# Patient Record
Sex: FEMALE | Race: WHITE | Employment: OTHER | URBAN - METROPOLITAN AREA
[De-identification: names, ages, dates, MRNs, and addresses within clinical notes are randomized per-mention and may not be internally consistent; named-entity substitution may affect disease eponyms.]

---

## 2017-01-12 ENCOUNTER — DOCUMENTATION ONLY (OUTPATIENT)
Dept: OTHER | Facility: CLINIC | Age: 82
End: 2017-01-12

## 2017-01-12 DIAGNOSIS — Z71.89 ACP (ADVANCE CARE PLANNING): Primary | Chronic | ICD-10-CM

## 2017-05-02 ENCOUNTER — APPOINTMENT (OUTPATIENT)
Age: 82
Setting detail: DERMATOLOGY
End: 2017-05-14

## 2017-05-02 PROBLEM — C44.319 BASAL CELL CARCINOMA OF SKIN OF OTHER PARTS OF FACE: Status: ACTIVE | Noted: 2017-05-02

## 2017-05-02 PROCEDURE — OTHER MIPS QUALITY: OTHER

## 2017-05-02 PROCEDURE — OTHER DEFER: OTHER

## 2017-05-02 PROCEDURE — OTHER COUNSELING: OTHER

## 2017-05-02 PROCEDURE — 99213 OFFICE O/P EST LOW 20 MIN: CPT

## 2017-05-02 NOTE — PROCEDURE: MIPS QUALITY
Quality 431: Preventive Care And Screening: Unhealthy Alcohol Use - Screening: Patient screened for unhealthy alcohol use using a single question and scores less than 2 times per year
Detail Level: Detailed
Quality 110: Preventive Care And Screening: Influenza Immunization: Influenza Immunization previously received during influenza season
Quality 130: Documentation Of Current Medications In The Medical Record: Current Medications Documented
Quality 226: Preventive Care And Screening: Tobacco Use: Screening And Cessation Intervention: Patient screened for tobacco and never smoked

## 2017-05-02 NOTE — PROCEDURE: DEFER
Procedure To Be Performed At Next Visit: Mohs surgery
Detail Level: Simple
Instructions (Optional): Due to patients age and the fact that this site is not symptomatic at this time, Macey, and her children (Amanda, Jez and Ayden) have elected to observe site for the time being. Will plan to recheck site in 6 months and plan for MOHs if/when the site recurs or starts causing Macey issues.

## 2018-09-19 ENCOUNTER — APPOINTMENT (OUTPATIENT)
Age: 83
Setting detail: DERMATOLOGY
End: 2018-09-28

## 2018-09-19 DIAGNOSIS — L85.8 OTHER SPECIFIED EPIDERMAL THICKENING: ICD-10-CM

## 2018-09-19 DIAGNOSIS — L21.8 OTHER SEBORRHEIC DERMATITIS: ICD-10-CM

## 2018-09-19 DIAGNOSIS — L57.0 ACTINIC KERATOSIS: ICD-10-CM

## 2018-09-19 DIAGNOSIS — L01.01 NON-BULLOUS IMPETIGO: ICD-10-CM

## 2018-09-19 PROBLEM — D48.5 NEOPLASM OF UNCERTAIN BEHAVIOR OF SKIN: Status: ACTIVE | Noted: 2018-09-19

## 2018-09-19 PROCEDURE — OTHER COUNSELING: OTHER

## 2018-09-19 PROCEDURE — 99213 OFFICE O/P EST LOW 20 MIN: CPT | Mod: 25

## 2018-09-19 PROCEDURE — OTHER PRESCRIPTION: OTHER

## 2018-09-19 PROCEDURE — 11101: CPT

## 2018-09-19 PROCEDURE — OTHER BIOPSY BY SHAVE METHOD: OTHER

## 2018-09-19 PROCEDURE — OTHER MIPS QUALITY: OTHER

## 2018-09-19 PROCEDURE — 11100: CPT

## 2018-09-19 RX ORDER — TRIAMCINOLONE ACETONIDE 1 MG/G
CREAM TOPICAL BID
Qty: 45 | Refills: 1 | Status: ERX | COMMUNITY
Start: 2018-09-19

## 2018-09-19 RX ORDER — CEPHALEXIN 250 MG/1
250 CAPSULE ORAL BID
Qty: 10 | Refills: 0 | Status: ERX | COMMUNITY
Start: 2018-09-19

## 2018-09-19 ASSESSMENT — LOCATION ZONE DERM
LOCATION ZONE: HAND
LOCATION ZONE: ARM

## 2018-09-19 ASSESSMENT — LOCATION SIMPLE DESCRIPTION DERM
LOCATION SIMPLE: LEFT HAND
LOCATION SIMPLE: LEFT FOREARM

## 2018-09-19 ASSESSMENT — LOCATION DETAILED DESCRIPTION DERM
LOCATION DETAILED: LEFT DISTAL RADIAL DORSAL FOREARM
LOCATION DETAILED: LEFT RADIAL DORSAL HAND

## 2018-09-19 NOTE — PROCEDURE: BIOPSY BY SHAVE METHOD
Billing Type: Third-Party Bill
Hemostasis: Aluminum Chloride and Electrocautery
Destruction After The Procedure: Yes
Cryotherapy Text: The wound bed was treated with cryotherapy after the biopsy was performed.
Wound Care: Vaseline
Size Of Lesion In Cm: 2.3
Body Location Override (Optional - Billing Will Still Be Based On Selected Body Map Location If Applicable): L radial wrist
Type Of Destruction Used: Electrodesiccation
Silver Nitrate Text: The wound bed was treated with silver nitrate after the biopsy was performed.
X Size Of Lesion In Cm: 1.3
Biopsy Method: double edge Personna blade
Consent: Written consent was obtained and risks were reviewed including but not limited to scarring, infection, bleeding, scabbing, incomplete removal, nerve damage and allergy to anesthesia.
Curettage Text: The wound bed was treated with curettage after the biopsy was performed.
Additional Anesthesia Volume In Cc (Will Not Render If 0): 0
Detail Level: Detailed
Body Location Override (Optional - Billing Will Still Be Based On Selected Body Map Location If Applicable): L dorsal hand 3rd ray
Notification Instructions: Patient will be notified of biopsy results. However, patient instructed to call the office if not contacted within 2 weeks.
X Size Of Lesion In Cm: 1
Anesthesia Type: 1% lidocaine with epinephrine and a 1:10 solution of 8.4% sodium bicarbonate
Post-Care Instructions: I reviewed with the patient in detail post-care instructions. Patient is to keep the biopsy site cover and dry overnight, and then apply H2O2,  vaseline  and a bandage daily until healed.
Anesthesia Volume In Cc (Will Not Render If 0): 1.5
Dressing: Band-Aid
Size Of Lesion In Cm: 1.6
Biopsy Type: H and E
Electrodesiccation Text: The wound bed was treated with electrodesiccation after the biopsy was performed.
Bill For Surgical Tray: no
Electrodesiccation And Curettage Text: The wound bed was treated with electrodesiccation and curettage after the biopsy was performed.
Depth Of Biopsy: dermis
Dressing: pressure dressing with telfa

## 2018-09-19 NOTE — PROCEDURE: MIPS QUALITY
Detail Level: Detailed
Quality 110: Preventive Care And Screening: Influenza Immunization: Influenza Immunization previously received during influenza season
Quality 131: Pain Assessment And Follow-Up: Pain assessment using a standardized tool is documented as negative, no follow-up plan required
Quality 431: Preventive Care And Screening: Unhealthy Alcohol Use - Screening: Patient screened for unhealthy alcohol use using a single question and scores less than 2 times per year
Quality 130: Documentation Of Current Medications In The Medical Record: Current Medications Documented
Quality 265: Biopsy Follow-Up: Biopsy results reviewed, communicated, tracked, and documented
Quality 226: Preventive Care And Screening: Tobacco Use: Screening And Cessation Intervention: Patient screened for tobacco and never smoked

## 2020-07-26 ENCOUNTER — NURSING HOME VISIT (OUTPATIENT)
Dept: GERIATRICS | Facility: CLINIC | Age: 85
End: 2020-07-26
Payer: MEDICARE

## 2020-07-26 DIAGNOSIS — Z53.9 ERRONEOUS ENCOUNTER--DISREGARD: Primary | ICD-10-CM

## 2020-07-27 ENCOUNTER — NURSING HOME VISIT (OUTPATIENT)
Dept: GERIATRICS | Facility: CLINIC | Age: 85
End: 2020-07-27
Payer: MEDICARE

## 2020-07-27 VITALS
DIASTOLIC BLOOD PRESSURE: 64 MMHG | RESPIRATION RATE: 18 BRPM | BODY MASS INDEX: 26.44 KG/M2 | HEART RATE: 60 BPM | TEMPERATURE: 97.1 F | SYSTOLIC BLOOD PRESSURE: 116 MMHG | WEIGHT: 143.7 LBS | HEIGHT: 62 IN | OXYGEN SATURATION: 96 %

## 2020-07-27 DIAGNOSIS — A09 DIARRHEA OF INFECTIOUS ORIGIN: ICD-10-CM

## 2020-07-27 DIAGNOSIS — A02.0 SALMONELLA ENTERITIS: Primary | ICD-10-CM

## 2020-07-27 DIAGNOSIS — N18.4 CHRONIC KIDNEY DISEASE, STAGE 4 (SEVERE) (H): ICD-10-CM

## 2020-07-27 DIAGNOSIS — I10 ESSENTIAL HYPERTENSION: ICD-10-CM

## 2020-07-27 PROCEDURE — 99305 1ST NF CARE MODERATE MDM 35: CPT | Performed by: NURSE PRACTITIONER

## 2020-07-27 RX ORDER — GINSENG 100 MG
CAPSULE ORAL 2 TIMES DAILY PRN
COMMUNITY
End: 2020-12-28

## 2020-07-27 RX ORDER — CIPROFLOXACIN 500 MG/1
500 TABLET, FILM COATED ORAL EVERY 24 HOURS
COMMUNITY
Start: 2020-07-26 | End: 2020-07-31

## 2020-07-27 RX ORDER — LACTOBACILLUS RHAMNOSUS GG 10B CELL
1 CAPSULE ORAL 2 TIMES DAILY
COMMUNITY

## 2020-07-27 RX ORDER — CLONIDINE HYDROCHLORIDE 0.1 MG/1
0.1 TABLET ORAL 2 TIMES DAILY
COMMUNITY
End: 2020-08-06 | Stop reason: DRUGHIGH

## 2020-07-27 RX ORDER — LOPERAMIDE HCL 2 MG
2 CAPSULE ORAL EVERY 4 HOURS PRN
COMMUNITY

## 2020-07-27 RX ORDER — LEVOTHYROXINE SODIUM 88 UG/1
88 TABLET ORAL DAILY
COMMUNITY

## 2020-07-27 RX ORDER — VIT A/VIT C/VIT E/ZINC/COPPER 4296-226
1 CAPSULE ORAL 2 TIMES DAILY
COMMUNITY

## 2020-07-27 RX ORDER — GABAPENTIN 300 MG/1
300 CAPSULE ORAL AT BEDTIME
COMMUNITY

## 2020-07-27 RX ORDER — TRIAMCINOLONE ACETONIDE 1 MG/G
CREAM TOPICAL 2 TIMES DAILY PRN
COMMUNITY

## 2020-07-27 RX ORDER — ACETAMINOPHEN 500 MG
1000 TABLET ORAL 2 TIMES DAILY
COMMUNITY

## 2020-07-27 RX ORDER — DORZOLAMIDE HCL 20 MG/ML
1 SOLUTION/ DROPS OPHTHALMIC 2 TIMES DAILY
COMMUNITY

## 2020-07-27 RX ORDER — CALCIUM CARBONATE 500 MG/1
1 TABLET, CHEWABLE ORAL PRN
COMMUNITY

## 2020-07-27 RX ORDER — DESONIDE 0.5 MG/G
OINTMENT TOPICAL 2 TIMES DAILY
COMMUNITY
End: 2020-12-28

## 2020-07-27 RX ORDER — SIMVASTATIN 20 MG
20 TABLET ORAL AT BEDTIME
COMMUNITY

## 2020-07-27 RX ORDER — CARBOXYMETHYLCELLULOSE SODIUM 10 MG/ML
1 GEL OPHTHALMIC 2 TIMES DAILY
COMMUNITY

## 2020-07-27 RX ORDER — TIMOLOL MALEATE 5 MG/ML
1 SOLUTION/ DROPS OPHTHALMIC 2 TIMES DAILY
COMMUNITY

## 2020-07-27 RX ORDER — CYANOCOBALAMIN
1000 KIT
COMMUNITY

## 2020-07-27 RX ORDER — ATENOLOL 50 MG/1
50 TABLET ORAL DAILY
COMMUNITY
End: 2020-12-28

## 2020-07-27 ASSESSMENT — MIFFLIN-ST. JEOR: SCORE: 995.07

## 2020-07-27 NOTE — PROGRESS NOTES
[place] [facility]  PRIMARY CARE PROVIDER AND CLINIC:  No primary care provider on file., No primary physician on file.  Chief Complaint   Patient presents with     Hospital F/U     [place] Medical Record Number:  [id number]  Place of Service where encounter took place:  [facility] ([facility]) [[id number]]    Emily Lockett  is a 89 year old  (12/7/1923), admitted to the above facility from  [facility] . Hospital stay 7/22/20 through 7/26/20..  Admitted to this facility for  rehab, medical management and nursing care.    HPI:    HPI information obtained from: facility chart records, facility staff and patient report.   Brief Summary of Hospital Course:   Updates on Status Since Skilled nursing Admission:     Patient Emily Lockett is a 89 yr old female admitted to [facility] for rehabilitation s/p hospitalization [facility] 7/22-7/26/20 for diarrhea with Salmonella enteritis, dehydration, hypokalemia due to excessive gastrointestinal loss of potassium and weakness. Patient hydrated and electrolytes replaced and started on as needed imodium. History of constipation and laxative on hold  Did develop wheezing 7/24/20 and required oxygen and weaned off   Patient started on Cipro for Salmonella    PMHx hypertension, hyperlipidemia, CKD stage IV (baseline Cr ~1.7), anemia (hgb ~10), recurrent basal cell carcinoma, mild TBI, osteoporosis, chronic pain, neuropathy, hypothyroidism, and Vitamin B12     TODAY  Patient states diarrhea resolved and appetite improving  Participating in therapies and goals is to return to assistive living          Received verbal consent to use Care Everywhere in order to access labs, documents, histories, and all other needed information to provide care at current facility.    CODE STATUS/ADVANCE DIRECTIVES DISCUSSION:   DNR / DNI  Patient's living condition: lives in an assisted living facility  ALLERGIES: Codeine; Nsaids; and Penicillins  PAST  MEDICAL HISTORY:  has a past medical history of Acute pancreatitis, Backache, Embolism and thrombosis (H), Essential hypertension, Fibrocystic breast, Hyperlipidemia, Osteoporosis, Rectocele, Renal insufficiency, and Vitamin B12 deficiency.  PAST SURGICAL HISTORY:   has a past surgical history that includes REMOVAL GALLBLADDER; tonsillectomy; adenoidectomy; and CATARACT REMOVAL.  FAMILY HISTORY: family history includes Arthritis in her mother; Breast Cancer in an other family member.  SOCIAL HISTORY:   reports that she has never smoked. She has never used smokeless tobacco.    Post Discharge Medication Reconciliation Status: discharge medications reconciled and changed, per note/orders      Current Outpatient Medications   Medication Sig Dispense Refill     acetaminophen (TYLENOL) 500 MG tablet Take 1,000 mg by mouth 2 times daily  MG every day PRN       atenolol (TENORMIN) 50 MG tablet Take 50 mg by mouth 2 times daily       bacitracin 500 UNIT/GM OINT Apply topically 2 times daily as needed for wound care For rash behind ear       bimatoprost (LUMIGAN) 0.01 % SOLN Place 1 drop into both eyes At Bedtime       calcium carbonate (TUMS) 500 MG chewable tablet Take 1 chew tab by mouth as needed for heartburn 2-3 TIMES every day PRN       carboxymethylcellulose PF (REFRESH LIQUIGEL) 1 % ophthalmic gel Place 1 drop into both eyes 3 times daily       Cholecalciferol (VITAMIN D3 PO) Take 1 capsule by mouth daily       ciprofloxacin (CIPRO) 500 MG tablet Take 500 mg by mouth every 24 hours       cloNIDine (CATAPRES) 0.1 MG tablet Take 0.1 mg by mouth 2 times daily       Cyanocobalamin (VITAMIN DEFICIENCY SYSTEM-B12) 1000 MCG/ML KIT Inject 1,000 mcg as directed every 28 days       desonide (DESOWEN) 0.05 % external ointment Apply topically 2 times daily For rash       dorzolamide (TRUSOPT) 2 % ophthalmic solution Place 1 drop into both eyes 2 times daily       FUROSEMIDE PO Take 10 mg by mouth daily       gabapentin  "(NEURONTIN) 300 MG capsule Take 300 mg by mouth At Bedtime       lactobacillus rhamnosus, GG, (CULTURELL) capsule Take 1 capsule by mouth 2 times daily       levothyroxine (SYNTHROID/LEVOTHROID) 100 MCG tablet Take 100 mcg by mouth daily       miconazole (MICATIN) 2 % AERP powder Apply topically 2 times daily as needed Apply to under breasts topically as needed for redness       Multiple Vitamins-Minerals (PRESERVISION AREDS) CAPS Take 1 capsule by mouth 2 times daily       omeprazole (PRILOSEC) 20 MG DR capsule Take 20 mg by mouth daily       simvastatin (ZOCOR) 20 MG tablet Take 20 mg by mouth At Bedtime       timolol maleate (TIMOPTIC) 0.5 % ophthalmic solution Place 1 drop into both eyes 2 times daily       triamcinolone (KENALOG) 0.1 % external cream Apply topically 2 times daily as needed for irritation         ROS:  10 point ROS of systems including Constitutional, Eyes, Respiratory, Cardiovascular, Gastroenterology, Genitourinary, Integumentary, Musculoskeletal, Psychiatric were all negative except for pertinent positives noted in my HPI.    Vitals:  /64   Pulse 60   Temp 97.1  F (36.2  C)   Resp 18   Ht 1.575 m (5' 2\")   Wt 65.2 kg (143 lb 11.2 oz)   SpO2 96%   BMI 26.28 kg/m    Exam:  GENERAL APPEARANCE:  Alert, in no distress  ENT:  Mouth and posterior oropharynx normal, moist mucous membranes  EYES:  EOM, conjunctivae, lids, pupils and irises normal  NECK:  No adenopathy,masses or thyromegaly  RESP:  no respiratory distress  M/S:   sitting in bed  SKIN:  Inspection of skin and subcutaneous tissue baseline  NEURO:   Cranial nerves 2-12 are normal tested and grossly at patient's baseline  PSYCH:  oriented X 3    Lab/Diagnostic data:  Labs done in SNF are in [place] EPIC. Please refer to them using [employer]/Care Everywhere.    ASSESSMENT/PLAN:  Salmonella enteritis  Diarrhea of infectious origin  Diarrhea resolve per patient   Continue Cipro course, monitor   Appetite improve, monitor   Labs " stable, monitor     Essential hypertension  blood pressure managed  Continue Clonidine & Atenolol, monitor   Presumed CHF  Continue lasix, monitor     Chronic kidney disease, stage 4 (severe) (H)  Stable, monitor     Chronic pain management  neuropathy  Denies pain   Continue Tylenol and as needed Tylenol & Neurontin, monitor     Hypothyroidism  Continue Levothyroxine  Monitor outpatient labs    Advanced care planning  Patient wishes to be DNR/DNI           Total time spent with patient visit at the [facility] nursing [facility] was 35 min including patient visit and review of past records. Greater than 50% of total time spent with counseling and coordinating care due to review functional goals, diarrhea management, chronic disease management and advanced care planning.     Electronically signed by:  YOUSIF Pan CNP

## 2020-07-27 NOTE — LETTER
7/27/2020        RE: Emily Lockett  [address]  [address]  [facility] [address]        [place] [facility]  PRIMARY CARE PROVIDER AND CLINIC:  No primary care provider on file., No primary physician on file.  Chief Complaint   Patient presents with     Hospital F/U     [place] Medical Record Number:  [id number]  Place of Service where encounter took place:  [facility] ([facility]) [[id number]]    Emily Lockett  is a 89 year old  (12/7/1923), admitted to the above facility from  [facility] . Hospital stay 7/22/20 through 7/26/20..  Admitted to this facility for  rehab, medical management and nursing care.    HPI:    HPI information obtained from: facility chart records, facility staff and patient report.   Brief Summary of Hospital Course:   Updates on Status Since Skilled nursing Admission:     Patient Emily Lockett is a 89 yr old female admitted to [facility] for rehabilitation s/p hospitalization [facility] 7/22-7/26/20 for diarrhea with Salmonella enteritis, dehydration, hypokalemia due to excessive gastrointestinal loss of potassium and weakness. Patient hydrated and electrolytes replaced and started on as needed imodium. History of constipation and laxative on hold  Did develop wheezing 7/24/20 and required oxygen and weaned off   Patient started on Cipro for Salmonella    PMHx hypertension, hyperlipidemia, CKD stage IV (baseline Cr ~1.7), anemia (hgb ~10), recurrent basal cell carcinoma, mild TBI, osteoporosis, chronic pain, neuropathy, hypothyroidism, and Vitamin B12     TODAY  Patient states diarrhea resolved and appetite improving  Participating in therapies and goals is to return to assistive living          Received verbal consent to use Care Everywhere in order to access labs, documents, histories, and all other needed information to provide care at current facility.    CODE STATUS/ADVANCE DIRECTIVES DISCUSSION:   DNR / DNI  Patient's  living condition: lives in an assisted living facility  ALLERGIES: Codeine; Nsaids; and Penicillins  PAST MEDICAL HISTORY:  has a past medical history of Acute pancreatitis, Backache, Embolism and thrombosis (H), Essential hypertension, Fibrocystic breast, Hyperlipidemia, Osteoporosis, Rectocele, Renal insufficiency, and Vitamin B12 deficiency.  PAST SURGICAL HISTORY:   has a past surgical history that includes REMOVAL GALLBLADDER; tonsillectomy; adenoidectomy; and CATARACT REMOVAL.  FAMILY HISTORY: family history includes Arthritis in her mother; Breast Cancer in an other family member.  SOCIAL HISTORY:   reports that she has never smoked. She has never used smokeless tobacco.    Post Discharge Medication Reconciliation Status: discharge medications reconciled and changed, per note/orders      Current Outpatient Medications   Medication Sig Dispense Refill     acetaminophen (TYLENOL) 500 MG tablet Take 1,000 mg by mouth 2 times daily  MG every day PRN       atenolol (TENORMIN) 50 MG tablet Take 50 mg by mouth 2 times daily       bacitracin 500 UNIT/GM OINT Apply topically 2 times daily as needed for wound care For rash behind ear       bimatoprost (LUMIGAN) 0.01 % SOLN Place 1 drop into both eyes At Bedtime       calcium carbonate (TUMS) 500 MG chewable tablet Take 1 chew tab by mouth as needed for heartburn 2-3 TIMES every day PRN       carboxymethylcellulose PF (REFRESH LIQUIGEL) 1 % ophthalmic gel Place 1 drop into both eyes 3 times daily       Cholecalciferol (VITAMIN D3 PO) Take 1 capsule by mouth daily       ciprofloxacin (CIPRO) 500 MG tablet Take 500 mg by mouth every 24 hours       cloNIDine (CATAPRES) 0.1 MG tablet Take 0.1 mg by mouth 2 times daily       Cyanocobalamin (VITAMIN DEFICIENCY SYSTEM-B12) 1000 MCG/ML KIT Inject 1,000 mcg as directed every 28 days       desonide (DESOWEN) 0.05 % external ointment Apply topically 2 times daily For rash       dorzolamide (TRUSOPT) 2 % ophthalmic solution  "Place 1 drop into both eyes 2 times daily       FUROSEMIDE PO Take 10 mg by mouth daily       gabapentin (NEURONTIN) 300 MG capsule Take 300 mg by mouth At Bedtime       lactobacillus rhamnosus, GG, (CULTURELL) capsule Take 1 capsule by mouth 2 times daily       levothyroxine (SYNTHROID/LEVOTHROID) 100 MCG tablet Take 100 mcg by mouth daily       miconazole (MICATIN) 2 % AERP powder Apply topically 2 times daily as needed Apply to under breasts topically as needed for redness       Multiple Vitamins-Minerals (PRESERVISION AREDS) CAPS Take 1 capsule by mouth 2 times daily       omeprazole (PRILOSEC) 20 MG DR capsule Take 20 mg by mouth daily       simvastatin (ZOCOR) 20 MG tablet Take 20 mg by mouth At Bedtime       timolol maleate (TIMOPTIC) 0.5 % ophthalmic solution Place 1 drop into both eyes 2 times daily       triamcinolone (KENALOG) 0.1 % external cream Apply topically 2 times daily as needed for irritation         ROS:  10 point ROS of systems including Constitutional, Eyes, Respiratory, Cardiovascular, Gastroenterology, Genitourinary, Integumentary, Musculoskeletal, Psychiatric were all negative except for pertinent positives noted in my HPI.    Vitals:  /64   Pulse 60   Temp 97.1  F (36.2  C)   Resp 18   Ht 1.575 m (5' 2\")   Wt 65.2 kg (143 lb 11.2 oz)   SpO2 96%   BMI 26.28 kg/m    Exam:  GENERAL APPEARANCE:  Alert, in no distress  ENT:  Mouth and posterior oropharynx normal, moist mucous membranes  EYES:  EOM, conjunctivae, lids, pupils and irises normal  NECK:  No adenopathy,masses or thyromegaly  RESP:  no respiratory distress  M/S:   sitting in bed  SKIN:  Inspection of skin and subcutaneous tissue baseline  NEURO:   Cranial nerves 2-12 are normal tested and grossly at patient's baseline  PSYCH:  oriented X 3    Lab/Diagnostic data:  Labs done in SNF are in [place] EPIC. Please refer to them using [employer]/Care Everywhere.    ASSESSMENT/PLAN:  Salmonella enteritis  Diarrhea of infectious " origin  Diarrhea resolve per patient   Continue Cipro course, monitor   Appetite improve, monitor   Labs stable, monitor     Essential hypertension  blood pressure managed  Continue Clonidine & Atenolol, monitor   Presumed CHF  Continue lasix, monitor     Chronic kidney disease, stage 4 (severe) (H)  Stable, monitor     Chronic pain management  neuropathy  Denies pain   Continue Tylenol and as needed Tylenol & Neurontin, monitor     Hypothyroidism  Continue Levothyroxine  Monitor outpatient labs    Advanced care planning  Patient wishes to be DNR/DNI           Total time spent with patient visit at the [facility] nursing [facility] was 35 min including patient visit and review of past records. Greater than 50% of total time spent with counseling and coordinating care due to review functional goals, diarrhea management, chronic disease management and advanced care planning.     Electronically signed by:  YOUSIF Pan CNP                         Sincerely,        YOUSIF Pan CNP

## 2020-07-29 ENCOUNTER — HOSPITAL LABORATORY (OUTPATIENT)
Dept: OTHER | Facility: CLINIC | Age: 85
End: 2020-07-29

## 2020-07-30 ENCOUNTER — NURSING HOME VISIT (OUTPATIENT)
Dept: GERIATRICS | Facility: CLINIC | Age: 85
End: 2020-07-30
Payer: MEDICARE

## 2020-07-30 VITALS
SYSTOLIC BLOOD PRESSURE: 166 MMHG | OXYGEN SATURATION: 94 % | BODY MASS INDEX: 19.39 KG/M2 | TEMPERATURE: 98 F | DIASTOLIC BLOOD PRESSURE: 77 MMHG | RESPIRATION RATE: 16 BRPM | HEART RATE: 92 BPM | WEIGHT: 106 LBS

## 2020-07-30 DIAGNOSIS — N18.4 CHRONIC KIDNEY DISEASE, STAGE 4 (SEVERE) (H): ICD-10-CM

## 2020-07-30 DIAGNOSIS — M10.342 ACUTE GOUT DUE TO RENAL IMPAIRMENT INVOLVING LEFT HAND: Primary | ICD-10-CM

## 2020-07-30 PROCEDURE — 99309 SBSQ NF CARE MODERATE MDM 30: CPT | Performed by: NURSE PRACTITIONER

## 2020-07-30 NOTE — PROGRESS NOTES
[place] [facility]    [place] Medical Record Number:  [id number]  Place of Service where encounter took place:  [facility] ([facility]) [[id number]]  Chief Complaint   Patient presents with     Nursing Home Acute       HPI:    Emily Lockett  is a 89 year old (12/7/1923), who is being seen today for an episodic care visit.  HPI information obtained from: facility chart records, facility staff, patient report, [facility] chart review and Care Everywhere [facility] chart review.    PMH: hypertension, CKD stage IV, hyperlipidemia, recurrent basal cell carcinoma, mild TBI, osteoporosis, and Vitamin B12 deficiency    Patient admitted to [facility] 7/22-7/26 due to diarrhea, emesis, weakness. Labs remarkable for K 3.0, Co2 17, Cr 1.62 (lower than her baseline of ~1.71), RBC 3.21, and Hgb 10.3. She was given KCl, Zofran, and started on IVF. Stool + Salmonella, was started on 5-day course of ciprofloxacin. Also required intermittent supplemental O2 during hospital stay.     Patient transferred to [facility] on 7/26.         Today's concern is:    RN reports today patient has new onset of left 3rd finger redness w/swelling and pain. Denies recent injury.     During exam, patient seen sitting in wheelchair. Endorses pain to L 3rd finger. Denies known injuries or trauma to L hand. Reports hx gout, but only had episodes of gout to great toe. Afebrile.         Past Medical and Surgical History reviewed in Epic today.    MEDICATIONS:    Current Outpatient Medications   Medication Sig Dispense Refill     acetaminophen (TYLENOL) 500 MG tablet Take 1,000 mg by mouth 2 times daily  MG every day PRN       atenolol (TENORMIN) 50 MG tablet Take 50 mg by mouth 2 times daily       bacitracin 500 UNIT/GM OINT Apply topically 2 times daily as needed for wound care For rash behind ear       bimatoprost (LUMIGAN) 0.01 % SOLN Place 1 drop into both eyes At Bedtime       calcium carbonate (TUMS) 500 MG chewable tablet Take 1  chew tab by mouth as needed for heartburn 2-3 TIMES every day PRN       carboxymethylcellulose PF (REFRESH LIQUIGEL) 1 % ophthalmic gel Place 1 drop into both eyes 3 times daily       Cholecalciferol (VITAMIN D3 PO) Take 1 capsule by mouth daily       ciprofloxacin (CIPRO) 500 MG tablet Take 500 mg by mouth every 24 hours       cloNIDine (CATAPRES) 0.1 MG tablet Take 0.1 mg by mouth 2 times daily       Cyanocobalamin (VITAMIN DEFICIENCY SYSTEM-B12) 1000 MCG/ML KIT Inject 1,000 mcg as directed every 28 days       desonide (DESOWEN) 0.05 % external ointment Apply topically 2 times daily For rash       dorzolamide (TRUSOPT) 2 % ophthalmic solution Place 1 drop into both eyes 2 times daily       FUROSEMIDE PO Take 10 mg by mouth daily       gabapentin (NEURONTIN) 300 MG capsule Take 300 mg by mouth At Bedtime       lactobacillus rhamnosus, GG, (CULTURELL) capsule Take 1 capsule by mouth 2 times daily       levothyroxine (SYNTHROID/LEVOTHROID) 100 MCG tablet Take 100 mcg by mouth daily       loperamide (IMODIUM) 2 MG capsule Take 2 mg by mouth every 4 hours as needed for diarrhea MAX 2MG 3 XDAY       miconazole (MICATIN) 2 % AERP powder Apply topically 2 times daily as needed Apply to under breasts topically as needed for redness       Multiple Vitamins-Minerals (PRESERVISION AREDS) CAPS Take 1 capsule by mouth 2 times daily       omeprazole (PRILOSEC) 20 MG DR capsule Take 20 mg by mouth daily       simvastatin (ZOCOR) 20 MG tablet Take 20 mg by mouth At Bedtime       timolol maleate (TIMOPTIC) 0.5 % ophthalmic solution Place 1 drop into both eyes 2 times daily       triamcinolone (KENALOG) 0.1 % external cream Apply topically 2 times daily as needed for irritation             REVIEW OF SYSTEMS:  4 point ROS including Respiratory, CV, GI and , other than that noted in the HPI,  is negative      Objective:  BP (!) 166/77   Pulse 92   Temp 98  F (36.7  C)   Resp 16   Wt 48.1 kg (106 lb)   SpO2 94%   BMI 19.39  kg/m    Exam:  Limited observational exam due to COVID19 precautions  GENERAL APPEARANCE:  Alert, in no distress, appears healthy, oriented, cooperative  ENT:  Mouth and posterior oropharynx normal, moist mucous membranes, normal hearing acuity  EYES:  EOM, conjunctivae, lids, pupils and irises normal, PERRL  NECK:  No adenopathy,masses or thyromegaly  RESP:  no respiratory distress, no coughing  CV:  +2 BLE edema  M/S:   Gait and station abnormal, resting in wheelchair. Unable to assess ambulation.  SKIN:  Inspection of skin and subcutaneous tissue baseline. L 3rd finger w/erythema and swelling over MCP joint, no warmth.  NEURO:   Cranial nerves 2-12 are normal tested and grossly at patient's baseline  PSYCH:  Oriented x 2, affect and mood normal      Labs:   Recent labs in [facility] reviewed by me today.       XR L hand 7/30/20:          ASSESSMENT/PLAN:    (M10.342) Acute gout due to renal impairment involving left hand  (primary encounter diagnosis)  (N18.4) Chronic kidney disease, stage 4 (severe) (H)  Comment: Acute episode of gout to L 3rd finger over MCP joint. Creat baseline 1.7, last creat was 1.39 on 7/23.  Plan:   - XR L hand d/t unknown injury  - Encourage fluids  - Check CBC & BMP 7/31   - Add prednisone 40mg every day x 5 days   - RN to outline area of erythema to L 3rd finger, monitor qshift and update provider if area worsens        Electronically signed by:  YOUSIF Boothe CNP

## 2020-07-31 ENCOUNTER — NURSING HOME VISIT (OUTPATIENT)
Dept: GERIATRICS | Facility: CLINIC | Age: 85
End: 2020-07-31
Payer: MEDICARE

## 2020-07-31 ENCOUNTER — HOSPITAL LABORATORY (OUTPATIENT)
Dept: OTHER | Facility: CLINIC | Age: 85
End: 2020-07-31

## 2020-07-31 VITALS
OXYGEN SATURATION: 92 % | HEART RATE: 60 BPM | SYSTOLIC BLOOD PRESSURE: 160 MMHG | DIASTOLIC BLOOD PRESSURE: 56 MMHG | BODY MASS INDEX: 19.39 KG/M2 | WEIGHT: 106 LBS | TEMPERATURE: 97.7 F | RESPIRATION RATE: 18 BRPM

## 2020-07-31 DIAGNOSIS — N18.4 ANEMIA DUE TO STAGE 4 CHRONIC KIDNEY DISEASE (H): ICD-10-CM

## 2020-07-31 DIAGNOSIS — R60.0 BILATERAL LOWER EXTREMITY EDEMA: ICD-10-CM

## 2020-07-31 DIAGNOSIS — R53.81 PHYSICAL DECONDITIONING: ICD-10-CM

## 2020-07-31 DIAGNOSIS — A02.0 SALMONELLA ENTERITIS: Primary | ICD-10-CM

## 2020-07-31 DIAGNOSIS — I10 ESSENTIAL HYPERTENSION: ICD-10-CM

## 2020-07-31 DIAGNOSIS — N18.4 CHRONIC KIDNEY DISEASE, STAGE 4 (SEVERE) (H): ICD-10-CM

## 2020-07-31 DIAGNOSIS — D63.1 ANEMIA DUE TO STAGE 4 CHRONIC KIDNEY DISEASE (H): ICD-10-CM

## 2020-07-31 DIAGNOSIS — F03.90 DEMENTIA WITHOUT BEHAVIORAL DISTURBANCE, UNSPECIFIED DEMENTIA TYPE: ICD-10-CM

## 2020-07-31 LAB
ANION GAP SERPL CALCULATED.3IONS-SCNC: 6 MMOL/L (ref 3–14)
BUN SERPL-MCNC: 30 MG/DL (ref 7–30)
CALCIUM SERPL-MCNC: 8.2 MG/DL (ref 8.5–10.1)
CHLORIDE SERPL-SCNC: 116 MMOL/L (ref 94–109)
CO2 SERPL-SCNC: 20 MMOL/L (ref 20–32)
CREAT SERPL-MCNC: 1.69 MG/DL (ref 0.52–1.04)
ERYTHROCYTE [DISTWIDTH] IN BLOOD BY AUTOMATED COUNT: 14.6 % (ref 10–15)
GFR SERPL CREATININE-BSD FRML MDRD: 25 ML/MIN/{1.73_M2}
GLUCOSE SERPL-MCNC: 77 MG/DL (ref 70–99)
HCT VFR BLD AUTO: 29.2 % (ref 35–47)
HGB BLD-MCNC: 9.6 G/DL (ref 11.7–15.7)
MCH RBC QN AUTO: 32.2 PG (ref 26.5–33)
MCHC RBC AUTO-ENTMCNC: 32.9 G/DL (ref 31.5–36.5)
MCV RBC AUTO: 98 FL (ref 78–100)
PLATELET # BLD AUTO: 191 10E9/L (ref 150–450)
POTASSIUM SERPL-SCNC: 3.9 MMOL/L (ref 3.4–5.3)
RBC # BLD AUTO: 2.98 10E12/L (ref 3.8–5.2)
SARS-COV-2 RNA SPEC QL NAA+PROBE: NOT DETECTED
SODIUM SERPL-SCNC: 142 MMOL/L (ref 133–144)
SPECIMEN SOURCE: NORMAL
TSH SERPL DL<=0.005 MIU/L-ACNC: 3.01 MU/L (ref 0.4–4)
WBC # BLD AUTO: 6.5 10E9/L (ref 4–11)

## 2020-07-31 PROCEDURE — 99309 SBSQ NF CARE MODERATE MDM 30: CPT | Performed by: NURSE PRACTITIONER

## 2020-07-31 RX ORDER — PREDNISONE 20 MG/1
40 TABLET ORAL DAILY
COMMUNITY
Start: 2020-07-31 | End: 2020-08-03

## 2020-07-31 NOTE — PROGRESS NOTES
[facility]    [place] Medical Record Number:  [id number]  Place of Service where encounter took place:  [facility] ([facility]) [[id number]]  Chief Complaint   Patient presents with     Nursing Home Acute       HPI:    Emily Lockett  is a 89 year old (12/7/1923), who is being seen today for an episodic care visit.  HPI information obtained from: facility chart records, facility staff, patient report and [facility] chart review.     PMH: hypertension, CKD stage IV, hyperlipidemia, recurrent basal cell carcinoma, mild TBI, osteoporosis, and Vitamin B12 deficiency     Patient admitted to [facility] 7/22-7/26 due to diarrhea, emesis, weakness. Labs remarkable for K 3.0, Co2 17, Cr 1.62 (lower than her baseline of ~1.71), RBC 3.21, and Hgb 10.3. She was given KCl, Zofran, and started on IVF. Stool + Salmonella, was started on 5-day course of ciprofloxacin. Also required intermittent supplemental O2 during hospital stay.      Patient transferred to [facility] on 7/26.       Today's concern is:    During exam, patient seen sitting in wheelchair. States L 3rd finger already feels better, received first dose of prednisone this morning. Endorses good appetite, RN staff have been charting % intake w/meals. Denies abdominal pain, nausea, constipation or diarrhea. Admits to chronic BLE edema, attempts to elevate legs during the day. Patient is unsure if she has compression stockings. Denies chest pain, SOB, headache, syncope. SW following for discharge planning.           Past Medical and Surgical History reviewed in Epic today.    MEDICATIONS:    Current Outpatient Medications   Medication Sig Dispense Refill     acetaminophen (TYLENOL) 500 MG tablet Take 1,000 mg by mouth 2 times daily  MG every day PRN       atenolol (TENORMIN) 50 MG tablet Take 50 mg by mouth 2 times daily       bacitracin 500 UNIT/GM OINT Apply topically 2 times daily as needed for wound care For rash behind ear        bimatoprost (LUMIGAN) 0.01 % SOLN Place 1 drop into both eyes At Bedtime       calcium carbonate (TUMS) 500 MG chewable tablet Take 1 chew tab by mouth as needed for heartburn 2-3 TIMES every day PRN       carboxymethylcellulose PF (REFRESH LIQUIGEL) 1 % ophthalmic gel Place 1 drop into both eyes 3 times daily       Cholecalciferol (VITAMIN D3 PO) Take 1 capsule by mouth daily       cloNIDine (CATAPRES) 0.1 MG tablet Take 0.1 mg by mouth 2 times daily       Cyanocobalamin (VITAMIN DEFICIENCY SYSTEM-B12) 1000 MCG/ML KIT Inject 1,000 mcg as directed every 28 days       desonide (DESOWEN) 0.05 % external ointment Apply topically 2 times daily For rash       dorzolamide (TRUSOPT) 2 % ophthalmic solution Place 1 drop into both eyes 2 times daily       FUROSEMIDE PO Take 10 mg by mouth daily       gabapentin (NEURONTIN) 300 MG capsule Take 300 mg by mouth At Bedtime       lactobacillus rhamnosus, GG, (CULTURELL) capsule Take 1 capsule by mouth 2 times daily       levothyroxine (SYNTHROID/LEVOTHROID) 100 MCG tablet Take 100 mcg by mouth daily       loperamide (IMODIUM) 2 MG capsule Take 2 mg by mouth every 4 hours as needed for diarrhea MAX 2MG 3 XDAY       miconazole (MICATIN) 2 % AERP powder Apply topically 2 times daily as needed Apply to under breasts topically as needed for redness       Multiple Vitamins-Minerals (PRESERVISION AREDS) CAPS Take 1 capsule by mouth 2 times daily       omeprazole (PRILOSEC) 20 MG DR capsule Take 20 mg by mouth daily       predniSONE (DELTASONE) 20 MG tablet Take 40 mg by mouth daily       simvastatin (ZOCOR) 20 MG tablet Take 20 mg by mouth At Bedtime       timolol maleate (TIMOPTIC) 0.5 % ophthalmic solution Place 1 drop into both eyes 2 times daily       triamcinolone (KENALOG) 0.1 % external cream Apply topically 2 times daily as needed for irritation         REVIEW OF SYSTEMS:  4 point ROS including Respiratory, CV, GI and , other than that noted in the HPI,  is  negative      Objective:  BP (!) 160/56   Pulse 60   Temp 97.7  F (36.5  C)   Resp 18   Wt 48.1 kg (106 lb)   SpO2 92%   BMI 19.39 kg/m    Exam:  Limited observational exam due to COVID19 precautions  GENERAL APPEARANCE:  Alert, in no distress, appears healthy, oriented, cooperative  ENT:  Mouth and posterior oropharynx normal, moist mucous membranes, normal hearing acuity  EYES:  EOM, conjunctivae, lids, pupils and irises normal, PERRL  NECK:  No adenopathy,masses or thyromegaly  RESP:  no respiratory distress, no coughing  CV:  +2 BLE edema  M/S:   Gait and station abnormal, resting in wheelchair. Unable to assess ambulation.  SKIN:  Inspection of skin and subcutaneous tissue baseline. L 3rd finger w/erythema and swelling over MCP joint, no warmth (improving).  NEURO:   Cranial nerves 2-12 are normal tested and grossly at patient's baseline  PSYCH:  Oriented x 2, affect and mood normal        Labs:   Recent labs in [facility] reviewed by me today.   CBC RESULTS:   Recent Labs   Lab Test 07/31/20  0820   WBC 6.5   RBC 2.98*   HGB 9.6*   HCT 29.2*   MCV 98   MCH 32.2   MCHC 32.9   RDW 14.6        Last Comprehensive Metabolic Panel:  Sodium   Date Value Ref Range Status   07/31/2020 142 133 - 144 mmol/L Final     Potassium   Date Value Ref Range Status   07/31/2020 3.9 3.4 - 5.3 mmol/L Final     Chloride   Date Value Ref Range Status   07/31/2020 116 (H) 94 - 109 mmol/L Final     Carbon Dioxide   Date Value Ref Range Status   07/31/2020 20 20 - 32 mmol/L Final     Anion Gap   Date Value Ref Range Status   07/31/2020 6 3 - 14 mmol/L Final     Glucose   Date Value Ref Range Status   07/31/2020 77 70 - 99 mg/dL Final     Urea Nitrogen   Date Value Ref Range Status   07/31/2020 30 7 - 30 mg/dL Final     Creatinine   Date Value Ref Range Status   07/31/2020 1.69 (H) 0.52 - 1.04 mg/dL Final     GFR Estimate   Date Value Ref Range Status   07/31/2020 25 (L) >60 mL/min/[1.73_m2] Final     Comment:     Non   American GFR Calc  Starting 12/18/2018, serum creatinine based estimated GFR (eGFR) will be   calculated using the Chronic Kidney Disease Epidemiology Collaboration   (CKD-EPI) equation.       Calcium   Date Value Ref Range Status   07/31/2020 8.2 (L) 8.5 - 10.1 mg/dL Final       TSH   Date Value Ref Range Status   07/31/2020 3.01 0.40 - 4.00 mU/L Final           ASSESSMENT/PLAN:    (A02.0) Salmonella enteritis  (primary encounter diagnosis)  Comment: Resolved  Plan:   - Course of Ciprofloxacin completed on 7/30  - Continue imodium prn    (N18.4) Chronic kidney disease, stage 4 (severe) (H)  Comment: Creat baseline 1.7. Last creat was 1.6 on 7/31.  Plan:   - Monitor BMP, avoid nephrotoxic medications    (I10) Essential hypertension  Comment: Controlled  Plan:   - Continue clonidine, simvastatin, atenolol    (R60.0) Bilateral lower extremity edema  Comment: Chronic BLE edema. Questionable CHF findings on CXR during hospital stay.  Plan:   - Continue lasix 10mg every day   - OT to eval/treat edema  - Elevate legs at night    (N18.4,  D63.1) Anemia due to stage 4 chronic kidney disease (H)  Comment: Hgb baseline 9-10, no active sx of bleeding.  Plan:   - Monitor Hgb    (F03.90) Dementia without behavioral disturbance, unspecified dementia type (H)  (R53.81) Physical deconditioning  Comment: Ongoing physical deconditioning r/t recent hospitalization. Ambulates 100-200 ft with walker. Requires SBA-min assistance w/ADLs. SLUMS 14/30, indicating dementia with moderate cognitive impairment. PTA lives at [facility].   Plan:   - Encourage active participation in therapy session to increase strength and promote independence in activities and ADLs.   - SW following for discharge planning. Unclear goals for discharge plan, will follow-up with SW.           Electronically signed by:  YOUSIF Boothe CNP

## 2020-08-01 NOTE — PROGRESS NOTES
[facility]    [place] Medical Record Number:  [id number]  Place of Service where encounter took place:  [facility] [place] ([facility]) [[id number]]  Chief Complaint   Patient presents with     Nursing Home Acute       HPI:    Emily Lockett  is a 89 year old (12/7/1923), who is being seen today for an episodic care visit.  HPI information obtained from: facility chart records, facility staff, patient report and [facility] chart review.     PMH: hypertension, CKD stage IV, hyperlipidemia, recurrent basal cell carcinoma, mild TBI, osteoporosis, and Vitamin B12 deficiency     Patient admitted to [facility] 7/22-7/26 due to diarrhea, emesis, weakness. Labs remarkable for K 3.0, Co2 17, Cr 1.62 (lower than her baseline of ~1.71), RBC 3.21, and Hgb 10.3. She was given KCl, Zofran, and started on IVF. Stool + Salmonella, was started on 5-day course of ciprofloxacin. Also required intermittent supplemental O2 during hospital stay.      Patient transferred to [facility] on 7/26.       Today's concern is:    During exam, patient seen sitting in wheelchair. Patient doesn't recall pain or swelling to L hand, states she forgot about it. Endorses intermittent loose stools, but denies diarrhea. Denies abdominal pain, nausea. Reports good appetite. Sleeping well at night. Has been attempting to elevate legs at night and during the day. Denies chest pain, SOB, headache, syncope. SW following for discharge planning.           Past Medical and Surgical History reviewed in Epic today.    MEDICATIONS:    Current Outpatient Medications   Medication Sig Dispense Refill     acetaminophen (TYLENOL) 500 MG tablet Take 1,000 mg by mouth 2 times daily  MG every day PRN       atenolol (TENORMIN) 50 MG tablet Take 50 mg by mouth 2 times daily       bacitracin 500 UNIT/GM OINT Apply topically 2 times daily as needed for wound care For rash behind ear       bimatoprost (LUMIGAN) 0.01 % SOLN Place 1 drop into both eyes At  Bedtime       calcium carbonate (TUMS) 500 MG chewable tablet Take 1 chew tab by mouth as needed for heartburn 2-3 TIMES every day PRN       carboxymethylcellulose PF (REFRESH LIQUIGEL) 1 % ophthalmic gel Place 1 drop into both eyes 3 times daily       Cholecalciferol (VITAMIN D3 PO) Take 1 capsule by mouth daily       cloNIDine (CATAPRES) 0.1 MG tablet Take 0.1 mg by mouth 2 times daily       Cyanocobalamin (VITAMIN DEFICIENCY SYSTEM-B12) 1000 MCG/ML KIT Inject 1,000 mcg as directed every 28 days       desonide (DESOWEN) 0.05 % external ointment Apply topically 2 times daily For rash       dorzolamide (TRUSOPT) 2 % ophthalmic solution Place 1 drop into both eyes 2 times daily       FUROSEMIDE PO Take 10 mg by mouth daily       gabapentin (NEURONTIN) 300 MG capsule Take 300 mg by mouth At Bedtime       lactobacillus rhamnosus, GG, (CULTURELL) capsule Take 1 capsule by mouth 2 times daily       levothyroxine (SYNTHROID/LEVOTHROID) 100 MCG tablet Take 100 mcg by mouth daily       loperamide (IMODIUM) 2 MG capsule Take 2 mg by mouth every 4 hours as needed for diarrhea MAX 2MG 3 XDAY       miconazole (MICATIN) 2 % AERP powder Apply topically 2 times daily as needed Apply to under breasts topically as needed for redness       Multiple Vitamins-Minerals (PRESERVISION AREDS) CAPS Take 1 capsule by mouth 2 times daily       omeprazole (PRILOSEC) 20 MG DR capsule Take 20 mg by mouth daily       predniSONE (DELTASONE) 20 MG tablet Take 40 mg by mouth daily       simvastatin (ZOCOR) 20 MG tablet Take 20 mg by mouth At Bedtime       timolol maleate (TIMOPTIC) 0.5 % ophthalmic solution Place 1 drop into both eyes 2 times daily       triamcinolone (KENALOG) 0.1 % external cream Apply topically 2 times daily as needed for irritation           REVIEW OF SYSTEMS:  4 point ROS including Respiratory, CV, GI and , other than that noted in the HPI,  is negative      Objective:  BP (!) 177/76   Pulse 77   Temp 96.7  F (35.9  C)    Resp 18   Wt 48.1 kg (106 lb)   SpO2 97%   BMI 19.39 kg/m    Exam:  Limited observational exam due to COVID19 precautions  GENERAL APPEARANCE:  Alert, in no distress, appears healthy, oriented, cooperative  ENT:  Mouth and posterior oropharynx normal, moist mucous membranes, normal hearing acuity  EYES:  EOM, conjunctivae, lids, pupils and irises normal, PERRL  NECK:  No adenopathy,masses or thyromegaly  RESP:  no respiratory distress, no coughing  CV:  +2 BLE edema  M/S:   Gait and station abnormal, resting in wheelchair. Unable to assess ambulation.  SKIN:  Inspection of skin and subcutaneous tissue baseline. L 3rd finger w/slight erythema, almost resolved.  NEURO:   Cranial nerves 2-12 are normal tested and grossly at patient's baseline  PSYCH:  Oriented x 2, affect and mood normal    Wt Readings from Last 4 Encounters:   08/03/20 48.1 kg (106 lb)   07/31/20 48.1 kg (106 lb)   07/30/20 48.1 kg (106 lb)   07/27/20 65.2 kg (143 lb 11.2 oz)         Labs:   Labs done in SNF are in [email or web address]. Please refer to them using [email or web address]., Recent labs in EPIC reviewed by me today.  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  Plan:   - Discontinue prednisone, completed 4-day course which sx resolved  - Patient and son verbalize understanding and agrees with treatment plan.     (N18.4) Chronic kidney disease, stage 4 (severe) (H)  Comment: Creat baseline 1.7. Last creat was 1.6 on 7/31.  Plan:   - Monitor BMP, avoid nephrotoxic  medications    (I10) Essential hypertension  Comment: Uncontrolled HTN, likely r/t recent short course of prednisone  Plan:   - Add clonidine 0.1mg BID PRN dx hypertension SBP > 170  - Consider increasing scheduled clonidine, but elevated BP could be r/t short course of prednisone  - Increase clonidine to 0.2mg qAM and 0.1mg at bedtime   - Monitor BP/HR  - Check BMP 8/10  UPDATE: Pharmacy recommendations noted based on creat clearance of 17-18, decrease atenolol to 50m gd  - Decrease atenolol to 50mg every day dx HTN    (R60.0) Bilateral lower extremity edema  Comment: Chronic BLE edema. Questionable CHF findings on CXR during hospital stay.  Plan:   - Continue lasix 10mg every day   - OT to eval/treat edema  - Elevate legs at night  - Patient and son verbalize understanding and agrees with treatment plan.     (N18.4,  D63.1) Anemia due to stage 4 chronic kidney disease (H)  Comment: Hgb baseline 9-10, no active sx of bleeding.  Plan:   - Continue vitamin B12 injections  - Check vitamin B12 level 8/10  - Check Hgb, iron panel 8/10    (F03.90) Dementia without behavioral disturbance, unspecified dementia type (H)  (R53.81) Physical deconditioning  Comment: Ongoing physical deconditioning r/t recent hospitalization. Ambulates 100-200 ft with walker. Requires SBA-min assistance w/ADLs. SLUMS 14/30, indicating dementia with moderate cognitive impairment. PTA lives at [facility].   Plan:   - Encourage active participation in therapy session to increase strength and promote independence in activities and ADLs.   - SW following for discharge planning  - Reviewed treatment plan with patient's son (), who reports family is looking into alternative placement options to provide more support upon discharge.   - Patient and son verbalize understanding and agrees with treatment plan.                 Total time spent with patient visit at the skilled nursing facility was 40 minutes including patient visit, review of past  records and phone call to patient contact (). Greater than 50% of total time spent with counseling patient and son regarding treatment plan, medication management, labs. Reviewed discharge planning with patient's son (), recommendations at this time would indicate 24hr care for safety, states diagnosis of dementia is new but has noticed slow cognitive and physical decline.  verbalizes understanding and agrees with treatment plan. Coordinating care with SW regarding discharge planning.     Electronically signed by:  YOUSIF Boothe CNP

## 2020-08-02 ENCOUNTER — TELEPHONE (OUTPATIENT)
Dept: GERIATRICS | Facility: CLINIC | Age: 85
End: 2020-08-02

## 2020-08-02 NOTE — TELEPHONE ENCOUNTER
Patient's BP running elevated this afternoon from 160-180/70-80s via machine; manual recheck 172/68 - will administer HS Clonidine at this time; should patient's BP be elevated this evening would recommend additional dose - requested nursing call as needed.    Dr. Cammie Coffman, APRN, DNP, A/GNP-[facility]  [address] [place] [address] [address]  [place] [address]     Cell: [phone]  Fax: [phone]  Email: [email or web address]

## 2020-08-03 ENCOUNTER — NURSING HOME VISIT (OUTPATIENT)
Dept: GERIATRICS | Facility: CLINIC | Age: 85
End: 2020-08-03
Payer: MEDICARE

## 2020-08-03 VITALS
TEMPERATURE: 96.7 F | OXYGEN SATURATION: 97 % | RESPIRATION RATE: 18 BRPM | SYSTOLIC BLOOD PRESSURE: 177 MMHG | BODY MASS INDEX: 19.39 KG/M2 | HEART RATE: 77 BPM | WEIGHT: 106 LBS | DIASTOLIC BLOOD PRESSURE: 76 MMHG

## 2020-08-03 DIAGNOSIS — R53.81 PHYSICAL DECONDITIONING: ICD-10-CM

## 2020-08-03 DIAGNOSIS — R60.0 BILATERAL LOWER EXTREMITY EDEMA: ICD-10-CM

## 2020-08-03 DIAGNOSIS — N18.4 ANEMIA DUE TO STAGE 4 CHRONIC KIDNEY DISEASE (H): ICD-10-CM

## 2020-08-03 DIAGNOSIS — I10 ESSENTIAL HYPERTENSION: ICD-10-CM

## 2020-08-03 DIAGNOSIS — M10.342 ACUTE GOUT DUE TO RENAL IMPAIRMENT INVOLVING LEFT HAND: Primary | ICD-10-CM

## 2020-08-03 DIAGNOSIS — F03.90 DEMENTIA WITHOUT BEHAVIORAL DISTURBANCE, UNSPECIFIED DEMENTIA TYPE: ICD-10-CM

## 2020-08-03 DIAGNOSIS — D63.1 ANEMIA DUE TO STAGE 4 CHRONIC KIDNEY DISEASE (H): ICD-10-CM

## 2020-08-03 DIAGNOSIS — N18.4 CHRONIC KIDNEY DISEASE, STAGE 4 (SEVERE) (H): ICD-10-CM

## 2020-08-03 PROCEDURE — 99310 SBSQ NF CARE HIGH MDM 45: CPT | Performed by: NURSE PRACTITIONER

## 2020-08-03 RX ORDER — KETOCONAZOLE 20 MG/ML
SHAMPOO TOPICAL
COMMUNITY

## 2020-08-04 ENCOUNTER — HOSPITAL LABORATORY (OUTPATIENT)
Dept: OTHER | Facility: CLINIC | Age: 85
End: 2020-08-04

## 2020-08-04 LAB
ANION GAP SERPL CALCULATED.3IONS-SCNC: 6 MMOL/L (ref 3–14)
BUN SERPL-MCNC: 41 MG/DL (ref 7–30)
CALCIUM SERPL-MCNC: 8.5 MG/DL (ref 8.5–10.1)
CHLORIDE SERPL-SCNC: 110 MMOL/L (ref 94–109)
CO2 SERPL-SCNC: 25 MMOL/L (ref 20–32)
CREAT SERPL-MCNC: 1.42 MG/DL (ref 0.52–1.04)
GFR SERPL CREATININE-BSD FRML MDRD: 31 ML/MIN/{1.73_M2}
GLUCOSE SERPL-MCNC: 82 MG/DL (ref 70–99)
POTASSIUM SERPL-SCNC: 4.7 MMOL/L (ref 3.4–5.3)
SODIUM SERPL-SCNC: 141 MMOL/L (ref 133–144)

## 2020-08-06 PROBLEM — M10.342 ACUTE GOUT DUE TO RENAL IMPAIRMENT INVOLVING LEFT HAND: Status: ACTIVE | Noted: 2020-08-06

## 2020-08-06 LAB
SARS-COV-2 RNA SPEC QL NAA+PROBE: NOT DETECTED
SPECIMEN SOURCE: NORMAL

## 2020-08-06 RX ORDER — CLONIDINE HYDROCHLORIDE 0.1 MG/1
TABLET ORAL
COMMUNITY
Start: 2020-08-06 | End: 2020-08-10 | Stop reason: DRUGHIGH

## 2020-08-10 ENCOUNTER — NURSING HOME VISIT (OUTPATIENT)
Dept: GERIATRICS | Facility: CLINIC | Age: 85
End: 2020-08-10
Payer: MEDICARE

## 2020-08-10 ENCOUNTER — HOSPITAL LABORATORY (OUTPATIENT)
Dept: OTHER | Facility: CLINIC | Age: 85
End: 2020-08-10

## 2020-08-10 VITALS
RESPIRATION RATE: 18 BRPM | TEMPERATURE: 97 F | DIASTOLIC BLOOD PRESSURE: 59 MMHG | BODY MASS INDEX: 19.68 KG/M2 | OXYGEN SATURATION: 100 % | SYSTOLIC BLOOD PRESSURE: 115 MMHG | WEIGHT: 107.6 LBS | HEART RATE: 56 BPM

## 2020-08-10 DIAGNOSIS — F03.90 DEMENTIA WITHOUT BEHAVIORAL DISTURBANCE, UNSPECIFIED DEMENTIA TYPE: ICD-10-CM

## 2020-08-10 DIAGNOSIS — R53.81 PHYSICAL DECONDITIONING: ICD-10-CM

## 2020-08-10 DIAGNOSIS — N18.4 CHRONIC KIDNEY DISEASE, STAGE 4 (SEVERE) (H): Primary | ICD-10-CM

## 2020-08-10 DIAGNOSIS — D63.1 ANEMIA DUE TO STAGE 4 CHRONIC KIDNEY DISEASE (H): ICD-10-CM

## 2020-08-10 DIAGNOSIS — I10 ESSENTIAL HYPERTENSION: ICD-10-CM

## 2020-08-10 DIAGNOSIS — R60.0 BILATERAL LOWER EXTREMITY EDEMA: ICD-10-CM

## 2020-08-10 DIAGNOSIS — N18.4 ANEMIA DUE TO STAGE 4 CHRONIC KIDNEY DISEASE (H): ICD-10-CM

## 2020-08-10 LAB
ANION GAP SERPL CALCULATED.3IONS-SCNC: 4 MMOL/L (ref 3–14)
BUN SERPL-MCNC: 45 MG/DL (ref 7–30)
CALCIUM SERPL-MCNC: 8.5 MG/DL (ref 8.5–10.1)
CHLORIDE SERPL-SCNC: 109 MMOL/L (ref 94–109)
CO2 SERPL-SCNC: 27 MMOL/L (ref 20–32)
CREAT SERPL-MCNC: 1.59 MG/DL (ref 0.52–1.04)
FERRITIN SERPL-MCNC: 112 NG/ML (ref 8–252)
GFR SERPL CREATININE-BSD FRML MDRD: 27 ML/MIN/{1.73_M2}
GLUCOSE SERPL-MCNC: 87 MG/DL (ref 70–99)
HGB BLD-MCNC: 8.6 G/DL (ref 11.7–15.7)
IRON SATN MFR SERPL: 15 % (ref 15–46)
IRON SERPL-MCNC: 40 UG/DL (ref 35–180)
POTASSIUM SERPL-SCNC: 4.6 MMOL/L (ref 3.4–5.3)
SODIUM SERPL-SCNC: 140 MMOL/L (ref 133–144)
TIBC SERPL-MCNC: 265 UG/DL (ref 240–430)
VIT B12 SERPL-MCNC: 706 PG/ML (ref 193–986)

## 2020-08-10 PROCEDURE — 99316 NF DSCHRG MGMT 30 MIN+: CPT | Performed by: NURSE PRACTITIONER

## 2020-08-10 RX ORDER — CLONIDINE HYDROCHLORIDE 0.1 MG/1
0.1 TABLET ORAL 2 TIMES DAILY
COMMUNITY
End: 2020-12-28

## 2020-08-10 NOTE — PROGRESS NOTES
[facility] DISCHARGE SUMMARY    PATIENT'S NAME: Emily Lockett  YOB: 1923  MEDICAL RECORD NUMBER:  [id number]  Place of Service where encounter took place:  [facility] [[id number]]    PRIMARY CARE PROVIDER AND CLINIC RESPONSIBLE AFTER TRANSFER:   ESTHER Mejia [facility] [address] [facility]    Non-[facility] Provider     Transferring providers: YOUSIF Boothe CNP, Earl Payne MD  Recent Hospitalization/ED:  [facility]  stay 7/22 to 7/26/20.  Date of SNF Admission: July / 26 / 2020  Date of SNF (anticipated) Discharge: August / 12 / 2020  Discharged to: [facility]  Cognitive Scores: SLUMS: 14/30  Physical Function: Ambulating 300 ft with walker, high fall risk. Requires SBA for bath transfers.   DME: Walker    CODE STATUS/ADVANCE DIRECTIVES DISCUSSION:  DNR / DNI   ALLERGIES: Codeine; Nsaids; and Penicillins      DISCHARGE DIAGNOSIS/NURSING FACILITY COURSE:     PMH: hypertension, CKD stage IV, hyperlipidemia, recurrent basal cell carcinoma, mild TBI, osteoporosis, and Vitamin B12 deficiency     Patient admitted to [facility] 7/22-7/26 due to diarrhea, emesis, weakness. Labs remarkable for K 3.0, Co2 17, Cr 1.62 (lower than her baseline of ~1.71), RBC 3.21, and Hgb 10.3. She was given KCl, Zofran, and started on IVF. Stool + Salmonella, was started on 5-day course of ciprofloxacin. Also required intermittent supplemental O2 during hospital stay.      Patient transferred to [facility] on 7/26.       Salmonella enteritis - Resolved  Patient completed course of Ciprofloxacin on 7/30. Denies diarrhea. Continue imodium prn.     Chronic kidney disease, stage 4 (severe) (H)  Essential hypertension  Bilateral lower extremity edema  Creat baseline 1.7. Last creat was 1.59 on 8/10. BP controlled, currently taking clonidine, simvastatin, atenolol, lasix. BLE edema improving with elevating BLE throughout the  day and at bedtime.     Creatinine   Date Value Ref Range Status   08/10/2020 1.59 (H) 0.52 - 1.04 mg/dL Final       Anemia due to stage 4 chronic kidney disease (H)  Hgb baseline 9-10. Last Hgb 8.6 on 8/10. No active sx of bleeding or bruising. RN states patient has not had bloody stools.   - Recommending to recheck Hgb within 2 weeks following discharge from TCU.    Hemoglobin   Date Value Ref Range Status   08/10/2020 8.6 (L) 11.7 - 15.7 g/dL Final       Dementia without behavioral disturbance, unspecified dementia type (H)  Physical deconditioning  PTA lives at [facility]. Ambulates 300 ft with walker; high fall risk. Requires SBA for bath transfers. SLUMS 14/30, indicating dementia with moderate cognitive impairment. Patient discharging to [facility], as well as [facility], home care services, including home PT, OT, RN, HHA.     Gout to Left 3rd MCP  Patient was treated with short course of prednisone d/t gout to L 3rd finger MCP joint. Redness, pain and swelling resolved.           Past Medical History:  has a past medical history of Acute pancreatitis, Backache, Embolism and thrombosis (H), Essential hypertension, Fibrocystic breast, Hyperlipidemia, Osteoporosis, Rectocele, Renal insufficiency, and Vitamin B12 deficiency.    Discharge Medications:    Current Outpatient Medications   Medication Sig Dispense Refill     acetaminophen (TYLENOL) 500 MG tablet Take 1,000 mg by mouth 2 times daily  MG every day PRN       atenolol (TENORMIN) 50 MG tablet Take 50 mg by mouth daily        bacitracin 500 UNIT/GM OINT Apply topically 2 times daily as needed for wound care For rash behind ear       bimatoprost (LUMIGAN) 0.01 % SOLN Place 1 drop into both eyes At Bedtime       calcium carbonate (TUMS) 500 MG chewable tablet Take 1 chew tab by mouth as needed for heartburn 2-3 TIMES every day PRN       carboxymethylcellulose PF (REFRESH LIQUIGEL) 1 % ophthalmic gel Place 1 drop into both eyes 3  times daily       Cholecalciferol (VITAMIN D3 PO) Take 1 capsule by mouth daily       cloNIDine (CATAPRES) 0.1 MG tablet Take 0.1 mg by mouth 2 times daily And BID PRN for SBP > 170       desonide (DESOWEN) 0.05 % external ointment Apply topically 2 times daily For rash       dorzolamide (TRUSOPT) 2 % ophthalmic solution Place 1 drop into both eyes 2 times daily       FUROSEMIDE PO Take 10 mg by mouth daily       gabapentin (NEURONTIN) 300 MG capsule Take 300 mg by mouth At Bedtime       ketoconazole (NIZORAL) 2 % external shampoo Apply topically twice a week       lactobacillus rhamnosus, GG, (CULTURELL) capsule Take 1 capsule by mouth 2 times daily       levothyroxine (SYNTHROID/LEVOTHROID) 100 MCG tablet Take 100 mcg by mouth daily       loperamide (IMODIUM) 2 MG capsule Take 2 mg by mouth every 4 hours as needed for diarrhea MAX 2MG 3 XDAY       miconazole (MICATIN) 2 % AERP powder Apply topically 2 times daily as needed Apply to under breasts topically as needed for redness       Multiple Vitamins-Minerals (PRESERVISION AREDS) CAPS Take 1 capsule by mouth 2 times daily       omeprazole (PRILOSEC) 20 MG DR capsule Take 20 mg by mouth daily       simvastatin (ZOCOR) 20 MG tablet Take 20 mg by mouth At Bedtime       timolol maleate (TIMOPTIC) 0.5 % ophthalmic solution Place 1 drop into both eyes 2 times daily       triamcinolone (KENALOG) 0.1 % external cream Apply topically 2 times daily as needed for irritation       Cyanocobalamin (VITAMIN DEFICIENCY SYSTEM-B12) 1000 MCG/ML KIT Inject 1,000 mcg as directed every 28 days         Medication Changes/Rationale:     Decreased atenolol due to creat clearance     Controlled medications sent with patient:   not applicable/none         ROS:   10 point ROS of systems including Constitutional, Eyes, Respiratory, Cardiovascular, Gastroenterology, Genitourinary, Integumentary, Musculoskeletal, Psychiatric were all negative except for pertinent positives noted in my  HPI.      Physical Exam:   Vitals: /59   Pulse 56   Temp 97  F (36.1  C)   Resp 18   Wt 48.8 kg (107 lb 9.6 oz)   SpO2 100%   BMI 19.68 kg/m    BMI= Body mass index is 19.68 kg/m .  Limited observational exam due to COVID19 precautions  GENERAL APPEARANCE:  Alert, in no distress, appears healthy, oriented, cooperative  ENT:  Mouth and posterior oropharynx normal, moist mucous membranes, normal hearing acuity  EYES:  EOM, conjunctivae, lids, pupils and irises normal, PERRL  NECK:  No adenopathy,masses or thyromegaly  RESP:  no respiratory distress, no coughing  CV:  +2 BLE edema  M/S:   Gait and station abnormal, resting in wheelchair. Unable to assess ambulation.  SKIN:  Inspection of skin and subcutaneous tissue baseline.  NEURO:   Cranial nerves 2-12 are normal tested and grossly at patient's baseline  PSYCH:  Oriented x 2, affect and mood normal    Wt Readings from Last 4 Encounters:   08/10/20 48.8 kg (107 lb 9.6 oz)   08/03/20 48.1 kg (106 lb)   07/31/20 48.1 kg (106 lb)   07/30/20 48.1 kg (106 lb)         SNF labs: Labs done in SNF are in [facility]. Please refer to them using [employer]Care Everywhere., Recent labs in EPIC reviewed by me today.  and Most Recent 3 CBC's:  Recent Labs   Lab Test 08/10/20  0617 07/31/20  0820   WBC  --  6.5   HGB 8.6* 9.6*   MCV  --  98   PLT  --  191     Most Recent 3 BMP's:  Recent Labs   Lab Test 08/10/20  0617 08/04/20  0558 07/31/20  0820    141 142   POTASSIUM 4.6 4.7 3.9   CHLORIDE 109 110* 116*   CO2 27 25 20   BUN 45* 41* 30   CR 1.59* 1.42* 1.69*   ANIONGAP 4 6 6   IMAN 8.5 8.5 8.2*   GLC 87 82 77     Most Recent 3 Creatinines:  Recent Labs   Lab Test 08/10/20  0617 08/04/20  0558 07/31/20  0820   CR 1.59* 1.42* 1.69*     Most Recent 3 Hemoglobins:  Recent Labs   Lab Test 08/10/20  0617 07/31/20  0820   HGB 8.6* 9.6*     Most Recent TSH and T4:  Recent Labs   Lab Test 07/31/20  0820   TSH 3.01     Ferritin   Date Value Ref Range Status   08/10/2020 112 8  - 252 ng/mL Final     Iron   Date Value Ref Range Status   08/10/2020 40 35 - 180 ug/dL Final     Iron Binding Cap   Date Value Ref Range Status   08/10/2020 265 240 - 430 ug/dL Final       Results for TISHA MADDEN (MRN [id number]) as of 8/10/2020 16:50   Ref. Range 8/10/2020 06:17   Vitamin B12 Latest Ref Range: 193 - 986 pg/mL 706             DISCHARGE PLAN:    Follow up labs:   - Check BMP & Hgb within 2 weeks of discharge from TCU w/results sent to PCP dx anemia, HTN      Medical Follow Up:      Follow up with primary care provider in 1-2 weeks      MTM referral needed and placed by this provider: No      Discharge Services: Home Care:  Occupational Therapy, Physical Therapy, Registered Nurse, Home Health Aide and From:  [facility]      Discharge Instructions Verbalized to Patient at Discharge:     Notify PCP if you have a fever greater than 100.5 degrees.     24-hour supervision is recommended for safety.                 TOTAL DISCHARGE TIME:   Greater than 30 minutes      Electronically signed by:  YOUSIF Boothe CNP

## 2020-08-11 ENCOUNTER — HOSPITAL LABORATORY (OUTPATIENT)
Dept: OTHER | Facility: CLINIC | Age: 85
End: 2020-08-11

## 2020-08-11 NOTE — PROGRESS NOTES
[facility] Discharge Orders    Name: Emily Lockett  : 1923  Planned Discharge Date: 20  Discharged to: new assisted living for patient - [facility]      MEDICAL FOLLOW UP  Follow up with PCP in 1-2 weeks       FUTURE LABS:   - Check BMP & Hgb within 2 weeks of discharge from TCU w/results sent to PCP dx anemia, HTN       ORDER CHANGES:    Decreased atenolol due to creat clearance      DISCHARGE MEDICATIONS:  The patient s pharmacy is authorized to dispense a 30-day supply of medications. Refill requests should be directed to the primary provider, Maksim Castano.   No narcotics are prescribed at time of discharge.     Current Outpatient Medications   Medication Sig Dispense Refill     acetaminophen (TYLENOL) 500 MG tablet Take 1,000 mg by mouth 2 times daily  MG every day PRN       atenolol (TENORMIN) 50 MG tablet Take 50 mg by mouth daily        bacitracin 500 UNIT/GM OINT Apply topically 2 times daily as needed for wound care For rash behind ear       bimatoprost (LUMIGAN) 0.01 % SOLN Place 1 drop into both eyes At Bedtime       calcium carbonate (TUMS) 500 MG chewable tablet Take 1 chew tab by mouth as needed for heartburn 2-3 TIMES every day PRN       carboxymethylcellulose PF (REFRESH LIQUIGEL) 1 % ophthalmic gel Place 1 drop into both eyes 3 times daily       Cholecalciferol (VITAMIN D3 PO) Take 1 capsule by mouth daily       cloNIDine (CATAPRES) 0.1 MG tablet Take 0.1 mg by mouth 2 times daily And BID PRN for SBP > 170       desonide (DESOWEN) 0.05 % external ointment Apply topically 2 times daily For rash       dorzolamide (TRUSOPT) 2 % ophthalmic solution Place 1 drop into both eyes 2 times daily       FUROSEMIDE PO Take 10 mg by mouth daily       gabapentin (NEURONTIN) 300 MG capsule Take 300 mg by mouth At Bedtime       ketoconazole (NIZORAL) 2 % external shampoo Apply topically twice a week       lactobacillus rhamnosus, GG, (CULTURELL) capsule Take 1 capsule  by mouth 2 times daily       levothyroxine (SYNTHROID/LEVOTHROID) 100 MCG tablet Take 100 mcg by mouth daily       loperamide (IMODIUM) 2 MG capsule Take 2 mg by mouth every 4 hours as needed for diarrhea MAX 2MG 3 XDAY       miconazole (MICATIN) 2 % AERP powder Apply topically 2 times daily as needed Apply to under breasts topically as needed for redness       Multiple Vitamins-Minerals (PRESERVISION AREDS) CAPS Take 1 capsule by mouth 2 times daily       omeprazole (PRILOSEC) 20 MG DR capsule Take 20 mg by mouth daily       simvastatin (ZOCOR) 20 MG tablet Take 20 mg by mouth At Bedtime       timolol maleate (TIMOPTIC) 0.5 % ophthalmic solution Place 1 drop into both eyes 2 times daily       triamcinolone (KENALOG) 0.1 % external cream Apply topically 2 times daily as needed for irritation       Cyanocobalamin (VITAMIN DEFICIENCY SYSTEM-B12) 1000 MCG/ML KIT Inject 1,000 mcg as directed every 28 days         SERVICES:  Home Care:  Occupational Therapy, Physical Therapy, Registered Nurse, Home Health Aide and From:  [facility]    ADDITIONAL INSTRUCTIONS:  ? Notify PCP if you have a fever greater than 100.5 degrees.   ? 24-hour supervision is recommended for safety.         Lois Nathan, YOUSIF CNP  This document was electronically signed on August 10, 2020

## 2020-08-12 LAB
SARS-COV-2 RNA SPEC QL NAA+PROBE: NOT DETECTED
SPECIMEN SOURCE: NORMAL

## 2020-08-27 RX ORDER — LACTOBACILLUS RHAMNOSUS GG 15B CELL
CAPSULE, SPRINKLE ORAL
Qty: 60 CAPSULE | Refills: 5 | OUTPATIENT
Start: 2020-08-27

## 2020-08-27 RX ORDER — ATENOLOL 50 MG/1
TABLET ORAL
Qty: 30 TABLET | Refills: 5 | OUTPATIENT
Start: 2020-08-27

## 2020-08-27 RX ORDER — VIT A/VIT C/VIT E/ZINC/COPPER 4296-226
CAPSULE ORAL
Qty: 60 CAPSULE | Refills: 5 | OUTPATIENT
Start: 2020-08-27

## 2020-08-27 RX ORDER — GABAPENTIN 300 MG/1
CAPSULE ORAL
Qty: 30 CAPSULE | Refills: 5 | OUTPATIENT
Start: 2020-08-27

## 2020-08-27 RX ORDER — SIMVASTATIN 20 MG
TABLET ORAL
Qty: 30 TABLET | Refills: 5 | OUTPATIENT
Start: 2020-08-27

## 2020-08-27 RX ORDER — LEVOTHYROXINE SODIUM 100 UG/1
TABLET ORAL
Qty: 30 TABLET | Refills: 5 | OUTPATIENT
Start: 2020-08-27

## 2020-08-27 RX ORDER — FUROSEMIDE 20 MG
TABLET ORAL
Qty: 15 TABLET | Refills: 5 | OUTPATIENT
Start: 2020-08-27

## 2020-08-27 RX ORDER — CLONIDINE HYDROCHLORIDE 0.1 MG/1
TABLET ORAL
Qty: 120 TABLET | Refills: 5 | OUTPATIENT
Start: 2020-08-27

## 2020-09-22 PROBLEM — R11.2 NAUSEA & VOMITING: Status: ACTIVE | Noted: 2017-06-25

## 2020-09-22 PROBLEM — N17.9 AKI (ACUTE KIDNEY INJURY) (H): Status: ACTIVE | Noted: 2017-06-25

## 2020-09-22 PROBLEM — E86.0 DEHYDRATION: Status: ACTIVE | Noted: 2020-09-22

## 2020-09-22 PROBLEM — E78.5 HYPERLIPIDEMIA: Status: ACTIVE | Noted: 2020-09-22

## 2020-09-22 PROBLEM — E87.1 HYPONATREMIA: Status: ACTIVE | Noted: 2017-06-25

## 2020-09-22 PROBLEM — Z66 DNR (DO NOT RESUSCITATE): Status: ACTIVE | Noted: 2018-07-24

## 2020-09-22 PROBLEM — R19.7 DIARRHEA: Status: ACTIVE | Noted: 2020-09-22

## 2020-09-22 PROBLEM — I26.99 PULMONARY EMBOLISM AND INFARCTION (H): Status: ACTIVE | Noted: 2020-09-22

## 2020-09-22 PROBLEM — L21.9 SEBORRHEIC DERMATITIS: Status: ACTIVE | Noted: 2019-04-04

## 2020-09-22 PROBLEM — E87.6 HYPOKALEMIA DUE TO EXCESSIVE GASTROINTESTINAL LOSS OF POTASSIUM: Status: ACTIVE | Noted: 2020-09-22

## 2020-09-22 PROBLEM — N60.19 DIFFUSE CYSTIC MASTOPATHY: Status: ACTIVE | Noted: 2020-09-22

## 2020-09-22 PROBLEM — B37.2 SKIN YEAST INFECTION: Status: ACTIVE | Noted: 2019-04-04

## 2020-09-22 PROBLEM — F05 ACUTE CONFUSION DUE TO KNOWN MEDICAL CONDITION: Status: ACTIVE | Noted: 2017-06-25

## 2020-09-22 PROBLEM — Z00.00 ROUTINE GENERAL MEDICAL EXAMINATION AT A HEALTH CARE FACILITY: Status: ACTIVE | Noted: 2017-01-12

## 2020-09-22 PROBLEM — I65.29 OCCLUSION AND STENOSIS OF CAROTID ARTERY WITHOUT MENTION OF CEREBRAL INFARCTION: Status: ACTIVE | Noted: 2020-09-22

## 2020-12-28 ENCOUNTER — APPOINTMENT (OUTPATIENT)
Dept: MRI IMAGING | Facility: CLINIC | Age: 85
DRG: 056 | End: 2020-12-28
Attending: EMERGENCY MEDICINE
Payer: MEDICARE

## 2020-12-28 ENCOUNTER — APPOINTMENT (OUTPATIENT)
Dept: CT IMAGING | Facility: CLINIC | Age: 85
DRG: 056 | End: 2020-12-28
Attending: EMERGENCY MEDICINE
Payer: MEDICARE

## 2020-12-28 ENCOUNTER — HOSPITAL ENCOUNTER (INPATIENT)
Facility: CLINIC | Age: 85
LOS: 3 days | Discharge: HOME HEALTH | DRG: 056 | End: 2021-01-01
Attending: EMERGENCY MEDICINE | Admitting: INTERNAL MEDICINE
Payer: MEDICARE

## 2020-12-28 DIAGNOSIS — R29.6 FREQUENT FALLS: ICD-10-CM

## 2020-12-28 DIAGNOSIS — F05: ICD-10-CM

## 2020-12-28 DIAGNOSIS — E16.2 HYPOGLYCEMIA: Primary | ICD-10-CM

## 2020-12-28 DIAGNOSIS — N18.30 STAGE 3 CHRONIC KIDNEY DISEASE, UNSPECIFIED WHETHER STAGE 3A OR 3B CKD (H): ICD-10-CM

## 2020-12-28 DIAGNOSIS — D64.9 ANEMIA, UNSPECIFIED TYPE: ICD-10-CM

## 2020-12-28 DIAGNOSIS — R47.01 APHASIA: ICD-10-CM

## 2020-12-28 LAB
ALBUMIN SERPL-MCNC: 2.1 G/DL (ref 3.4–5)
ALBUMIN UR-MCNC: 10 MG/DL
ALP SERPL-CCNC: 78 U/L (ref 40–150)
ALT SERPL W P-5'-P-CCNC: 17 U/L (ref 0–50)
AMORPH CRY #/AREA URNS HPF: ABNORMAL /HPF
ANION GAP SERPL CALCULATED.3IONS-SCNC: 6 MMOL/L (ref 3–14)
APPEARANCE UR: CLEAR
APTT PPP: 42 SEC (ref 22–37)
AST SERPL W P-5'-P-CCNC: 10 U/L (ref 0–45)
BASOPHILS # BLD AUTO: 0 10E9/L (ref 0–0.2)
BASOPHILS NFR BLD AUTO: 0 %
BILIRUB DIRECT SERPL-MCNC: <0.1 MG/DL (ref 0–0.2)
BILIRUB SERPL-MCNC: <0.1 MG/DL (ref 0.2–1.3)
BILIRUB UR QL STRIP: NEGATIVE
BUN SERPL-MCNC: 29 MG/DL (ref 7–30)
CALCIUM SERPL-MCNC: 8.2 MG/DL (ref 8.5–10.1)
CHLORIDE SERPL-SCNC: 113 MMOL/L (ref 94–109)
CO2 SERPL-SCNC: 20 MMOL/L (ref 20–32)
COLOR UR AUTO: YELLOW
CREAT SERPL-MCNC: 1.22 MG/DL (ref 0.52–1.04)
DIFFERENTIAL METHOD BLD: ABNORMAL
EOSINOPHIL # BLD AUTO: 0 10E9/L (ref 0–0.7)
EOSINOPHIL NFR BLD AUTO: 0.7 %
ERYTHROCYTE [DISTWIDTH] IN BLOOD BY AUTOMATED COUNT: 14.5 % (ref 10–15)
GFR SERPL CREATININE-BSD FRML MDRD: 37 ML/MIN/{1.73_M2}
GLUCOSE BLDC GLUCOMTR-MCNC: 133 MG/DL (ref 70–99)
GLUCOSE BLDC GLUCOMTR-MCNC: 53 MG/DL (ref 70–99)
GLUCOSE SERPL-MCNC: 93 MG/DL (ref 70–99)
GLUCOSE UR STRIP-MCNC: NEGATIVE MG/DL
HCT VFR BLD AUTO: 23.5 % (ref 35–47)
HGB BLD-MCNC: 7.4 G/DL (ref 11.7–15.7)
HGB UR QL STRIP: NEGATIVE
IMM GRANULOCYTES # BLD: 0 10E9/L (ref 0–0.4)
IMM GRANULOCYTES NFR BLD: 0.2 %
INR PPP: 1.08 (ref 0.86–1.14)
INTERPRETATION ECG - MUSE: NORMAL
KETONES UR STRIP-MCNC: NEGATIVE MG/DL
LABORATORY COMMENT REPORT: NORMAL
LEUKOCYTE ESTERASE UR QL STRIP: NEGATIVE
LYMPHOCYTES # BLD AUTO: 1.8 10E9/L (ref 0.8–5.3)
LYMPHOCYTES NFR BLD AUTO: 32.8 %
MCH RBC QN AUTO: 32.9 PG (ref 26.5–33)
MCHC RBC AUTO-ENTMCNC: 31.5 G/DL (ref 31.5–36.5)
MCV RBC AUTO: 104 FL (ref 78–100)
MONOCYTES # BLD AUTO: 0.5 10E9/L (ref 0–1.3)
MONOCYTES NFR BLD AUTO: 8.6 %
NEUTROPHILS # BLD AUTO: 3.2 10E9/L (ref 1.6–8.3)
NEUTROPHILS NFR BLD AUTO: 57.7 %
NITRATE UR QL: NEGATIVE
NRBC # BLD AUTO: 0 10*3/UL
NRBC BLD AUTO-RTO: 0 /100
PH UR STRIP: 6 PH (ref 5–7)
PLATELET # BLD AUTO: 88 10E9/L (ref 150–450)
POTASSIUM SERPL-SCNC: 4 MMOL/L (ref 3.4–5.3)
PROT SERPL-MCNC: 4.9 G/DL (ref 6.8–8.8)
RBC # BLD AUTO: 2.25 10E12/L (ref 3.8–5.2)
RBC #/AREA URNS AUTO: 0 /HPF (ref 0–2)
SARS-COV-2 RNA SPEC QL NAA+PROBE: NEGATIVE
SODIUM SERPL-SCNC: 139 MMOL/L (ref 133–144)
SOURCE: ABNORMAL
SP GR UR STRIP: 1.01 (ref 1–1.03)
SPECIMEN SOURCE: NORMAL
UROBILINOGEN UR STRIP-MCNC: 0.2 MG/DL (ref 0–2)
WBC # BLD AUTO: 5.6 10E9/L (ref 4–11)
WBC #/AREA URNS AUTO: <1 /HPF (ref 0–5)

## 2020-12-28 PROCEDURE — 80048 BASIC METABOLIC PNL TOTAL CA: CPT | Performed by: EMERGENCY MEDICINE

## 2020-12-28 PROCEDURE — C9803 HOPD COVID-19 SPEC COLLECT: HCPCS

## 2020-12-28 PROCEDURE — 99285 EMERGENCY DEPT VISIT HI MDM: CPT | Mod: 25

## 2020-12-28 PROCEDURE — 70496 CT ANGIOGRAPHY HEAD: CPT

## 2020-12-28 PROCEDURE — A9585 GADOBUTROL INJECTION: HCPCS | Performed by: EMERGENCY MEDICINE

## 2020-12-28 PROCEDURE — 258N000003 HC RX IP 258 OP 636: Performed by: EMERGENCY MEDICINE

## 2020-12-28 PROCEDURE — G0378 HOSPITAL OBSERVATION PER HR: HCPCS

## 2020-12-28 PROCEDURE — 0042T CT HEAD PERFUSION WITH CONTRAST: CPT

## 2020-12-28 PROCEDURE — 85610 PROTHROMBIN TIME: CPT | Performed by: EMERGENCY MEDICINE

## 2020-12-28 PROCEDURE — 250N000013 HC RX MED GY IP 250 OP 250 PS 637: Performed by: INTERNAL MEDICINE

## 2020-12-28 PROCEDURE — 258N000001 HC RX 258: Performed by: PHYSICIAN ASSISTANT

## 2020-12-28 PROCEDURE — 250N000011 HC RX IP 250 OP 636: Performed by: EMERGENCY MEDICINE

## 2020-12-28 PROCEDURE — 250N000009 HC RX 250: Performed by: INTERNAL MEDICINE

## 2020-12-28 PROCEDURE — 250N000013 HC RX MED GY IP 250 OP 250 PS 637: Performed by: EMERGENCY MEDICINE

## 2020-12-28 PROCEDURE — 255N000002 HC RX 255 OP 636: Performed by: EMERGENCY MEDICINE

## 2020-12-28 PROCEDURE — 81001 URINALYSIS AUTO W/SCOPE: CPT | Performed by: EMERGENCY MEDICINE

## 2020-12-28 PROCEDURE — 96360 HYDRATION IV INFUSION INIT: CPT

## 2020-12-28 PROCEDURE — 999N001017 HC STATISTIC GLUCOSE BY METER IP

## 2020-12-28 PROCEDURE — 96374 THER/PROPH/DIAG INJ IV PUSH: CPT

## 2020-12-28 PROCEDURE — 85025 COMPLETE CBC W/AUTO DIFF WBC: CPT | Performed by: EMERGENCY MEDICINE

## 2020-12-28 PROCEDURE — 70450 CT HEAD/BRAIN W/O DYE: CPT

## 2020-12-28 PROCEDURE — 93005 ELECTROCARDIOGRAM TRACING: CPT

## 2020-12-28 PROCEDURE — 70553 MRI BRAIN STEM W/O & W/DYE: CPT

## 2020-12-28 PROCEDURE — 80076 HEPATIC FUNCTION PANEL: CPT | Performed by: EMERGENCY MEDICINE

## 2020-12-28 PROCEDURE — 99220 PR INITIAL OBSERVATION CARE,LEVEL III: CPT | Performed by: INTERNAL MEDICINE

## 2020-12-28 PROCEDURE — 87635 SARS-COV-2 COVID-19 AMP PRB: CPT | Performed by: EMERGENCY MEDICINE

## 2020-12-28 PROCEDURE — 85730 THROMBOPLASTIN TIME PARTIAL: CPT | Performed by: EMERGENCY MEDICINE

## 2020-12-28 PROCEDURE — 96361 HYDRATE IV INFUSION ADD-ON: CPT

## 2020-12-28 PROCEDURE — 258N000003 HC RX IP 258 OP 636: Performed by: INTERNAL MEDICINE

## 2020-12-28 PROCEDURE — 250N000009 HC RX 250: Performed by: EMERGENCY MEDICINE

## 2020-12-28 RX ORDER — ONDANSETRON 4 MG/1
4 TABLET, ORALLY DISINTEGRATING ORAL EVERY 6 HOURS PRN
Status: DISCONTINUED | OUTPATIENT
Start: 2020-12-28 | End: 2021-01-01 | Stop reason: HOSPADM

## 2020-12-28 RX ORDER — GADOBUTROL 604.72 MG/ML
4.5 INJECTION INTRAVENOUS ONCE
Status: COMPLETED | OUTPATIENT
Start: 2020-12-28 | End: 2020-12-28

## 2020-12-28 RX ORDER — SODIUM CHLORIDE 9 MG/ML
INJECTION, SOLUTION INTRAVENOUS CONTINUOUS
Status: DISCONTINUED | OUTPATIENT
Start: 2020-12-28 | End: 2020-12-29

## 2020-12-28 RX ORDER — ASPIRIN 81 MG/1
81 TABLET, CHEWABLE ORAL DAILY
COMMUNITY

## 2020-12-28 RX ORDER — TIMOLOL MALEATE 5 MG/ML
1 SOLUTION/ DROPS OPHTHALMIC 2 TIMES DAILY
Status: DISCONTINUED | OUTPATIENT
Start: 2020-12-28 | End: 2021-01-01 | Stop reason: HOSPADM

## 2020-12-28 RX ORDER — NICOTINE POLACRILEX 4 MG
15-30 LOZENGE BUCCAL
Status: DISCONTINUED | OUTPATIENT
Start: 2020-12-28 | End: 2021-01-01 | Stop reason: HOSPADM

## 2020-12-28 RX ORDER — SODIUM CHLORIDE 9 MG/ML
INJECTION, SOLUTION INTRAVENOUS CONTINUOUS
Status: DISCONTINUED | OUTPATIENT
Start: 2020-12-28 | End: 2020-12-28

## 2020-12-28 RX ORDER — IOPAMIDOL 755 MG/ML
120 INJECTION, SOLUTION INTRAVASCULAR ONCE
Status: COMPLETED | OUTPATIENT
Start: 2020-12-28 | End: 2020-12-28

## 2020-12-28 RX ORDER — AMLODIPINE BESYLATE 2.5 MG/1
2.5 TABLET ORAL DAILY
COMMUNITY

## 2020-12-28 RX ORDER — DEXTROSE MONOHYDRATE 25 G/50ML
25-50 INJECTION, SOLUTION INTRAVENOUS
Status: DISCONTINUED | OUTPATIENT
Start: 2020-12-28 | End: 2021-01-01 | Stop reason: HOSPADM

## 2020-12-28 RX ORDER — ASPIRIN 81 MG/1
324 TABLET, CHEWABLE ORAL ONCE
Status: DISCONTINUED | OUTPATIENT
Start: 2020-12-28 | End: 2020-12-28

## 2020-12-28 RX ORDER — ASPIRIN 300 MG/1
300 SUPPOSITORY RECTAL ONCE
Status: COMPLETED | OUTPATIENT
Start: 2020-12-28 | End: 2020-12-28

## 2020-12-28 RX ORDER — ACETAMINOPHEN 325 MG/1
650 TABLET ORAL EVERY 4 HOURS PRN
Status: DISCONTINUED | OUTPATIENT
Start: 2020-12-28 | End: 2021-01-01 | Stop reason: HOSPADM

## 2020-12-28 RX ORDER — LIDOCAINE 40 MG/G
CREAM TOPICAL
Status: DISCONTINUED | OUTPATIENT
Start: 2020-12-28 | End: 2021-01-01 | Stop reason: HOSPADM

## 2020-12-28 RX ORDER — LABETALOL HYDROCHLORIDE 5 MG/ML
10-40 INJECTION, SOLUTION INTRAVENOUS EVERY 10 MIN PRN
Status: DISCONTINUED | OUTPATIENT
Start: 2020-12-28 | End: 2021-01-01 | Stop reason: HOSPADM

## 2020-12-28 RX ORDER — QUETIAPINE FUMARATE 25 MG/1
12.5 TABLET, FILM COATED ORAL AT BEDTIME
COMMUNITY

## 2020-12-28 RX ORDER — ONDANSETRON 2 MG/ML
4 INJECTION INTRAMUSCULAR; INTRAVENOUS EVERY 6 HOURS PRN
Status: DISCONTINUED | OUTPATIENT
Start: 2020-12-28 | End: 2021-01-01 | Stop reason: HOSPADM

## 2020-12-28 RX ADMIN — BIMATOPROST 1 DROP: 0.1 SOLUTION/ DROPS OPHTHALMIC at 22:11

## 2020-12-28 RX ADMIN — GADOBUTROL 4.5 ML: 604.72 INJECTION INTRAVENOUS at 16:06

## 2020-12-28 RX ADMIN — SODIUM CHLORIDE, PRESERVATIVE FREE: 5 INJECTION INTRAVENOUS at 19:19

## 2020-12-28 RX ADMIN — ASPIRIN 300 MG: 300 SUPPOSITORY RECTAL at 17:26

## 2020-12-28 RX ADMIN — SODIUM CHLORIDE 500 ML: 9 INJECTION, SOLUTION INTRAVENOUS at 14:32

## 2020-12-28 RX ADMIN — SODIUM CHLORIDE 100 ML: 9 INJECTION, SOLUTION INTRAVENOUS at 13:47

## 2020-12-28 RX ADMIN — SODIUM CHLORIDE, PRESERVATIVE FREE 10 ML: 5 INJECTION INTRAVENOUS at 16:06

## 2020-12-28 RX ADMIN — DEXTROSE MONOHYDRATE 25 ML: 500 INJECTION PARENTERAL at 22:00

## 2020-12-28 RX ADMIN — TIMOLOL MALEATE 1 DROP: 5 SOLUTION/ DROPS OPHTHALMIC at 21:43

## 2020-12-28 RX ADMIN — IOPAMIDOL 120 ML: 755 INJECTION, SOLUTION INTRAVENOUS at 13:47

## 2020-12-28 ASSESSMENT — ENCOUNTER SYMPTOMS
WEAKNESS: 1
WOUND: 1
FACIAL ASYMMETRY: 1
SPEECH DIFFICULTY: 1

## 2020-12-28 NOTE — ED NOTES
Bed: ST03  Expected date:   Expected time:   Means of arrival:   Comments:  [facility] - [phone] - [phone] F CVA eta [phone]

## 2020-12-28 NOTE — ED NOTES
[facility]  ED Nurse Handoff Report    ED Chief complaint: Aphasia      ED Diagnosis:   Final diagnoses:   Aphasia   Anemia, unspecified type   Frequent falls   Stage 3 chronic kidney disease, unspecified whether stage 3a or 3b CKD       Code Status: not discussed by ED RN     Allergies:   Allergies   Allergen Reactions     Codeine      Other reaction(s): Vomiting     Nsaids      Other reaction(s): Renal Failure     Penicillins Rash       Patient Story: 89F from assisted living, garbled speech, some expressive aphagia  Focused Assessment:  Pt has been becoming more weak lately, and has had several falls. Pt fell around 0530 today, and when she got up at 0900/1000 pt was unsteady on her feet for PT. Pt was found to have some expressive aphagia starting at 1200. Pt having some difficulty speaking, and has slight facial droop on the right side, more noticeable when not smiling. Pt able to make needs known. Pt hgb is 7.4    Treatments and/or interventions provided: aspirin when pt back from MRI if she passes dysphagia screen  Patient's response to treatments and/or interventions: no change    To be done/followed up on inpatient unit:      Does this patient have any cognitive concerns?: some dementia    Activity level - Baseline/Home:  Unknown  Activity Level - Current:   Unknown    Patient's Preferred language: English   Needed?: No    Isolation: None  Infection: Not Applicable  Patient tested for COVID 19 prior to admission: YES  Bariatric?: No    Vital Signs:   Vitals:    12/28/20 1445 12/28/20 1500 12/28/20 1530 12/28/20 1600   BP:  (!) 148/64 (!) 155/70    Pulse: 78 78 81 87   Resp: 11 13 16 17   Temp:       TempSrc:       SpO2: 96% 91% 97% 96%   Weight:           Cardiac Rhythm:     Was the PSS-3 completed:   Yes  What interventions are required if any?               Family Comments:   OBS brochure/video discussed/provided to patient/family: N/A              Name of person given brochure  if not patient:               Relationship to patient:     For the majority of the shift this patient's behavior was Green.   Behavioral interventions performed were .    ED NURSE PHONE NUMBER: [phone]

## 2020-12-28 NOTE — CONSULTS
[facility]    Stroke Telephone Note    I was called by Dr. Remy Rincon Trigger on 12/28/20 at 1331 regarding patient Emily Lockett. The patient is a 89 year old female who presented with confusion and facial droop outside the tPA window. Patient was found this morning on the fall with confusion and continue to decline this afternoon. LSN was 12/27/20 at 2030. CT/CTA/CTP showed diffuse atherosclerosis disease and some perfusion mismatch but no clear LVO. At baseline, patient lives in a nursing home and requires some help with her ADL, but she walks with a walker. Nursing staff thinks she fell this morning but states that she did not hit her head. Per nurse, she falls every 6 weeks but this morning after he was found down, they noticed bilateral lower extremity weakness which is new for her, after breakfast nurse noticed that the patient had worsening confusion.       Stroke Code Data  (for stroke code without tele)  Stroke code activated 12/28/20   1331   First stroke provider response 12/28/20   1338   Last known normal 12/27/20   2300   Time of discovery   (or onset of symptoms) 12/28/20   0600   Head CT read by me 12/28/20   1343   Was stroke code de-escalated? Yes 12/28/20 1408  patient is outside emergent treatment time parameters       TPA Treatment   Not given due to unclear or unfavorable risk-benefit profile for extended window thrombolysis beyond the conventional 4.5 hour time window.    Endovascular Treatment  Not initiated due to absence of proximal vessel occlusion    Impression  Confusion  Facial Droop   Concern for stroke vs stroke mimic     Recommendations  - MRI Stroke Protocol    We will follow up on the MRI brain and further recs as needed.       Addendum 12/28/20 4:43 PM     MRI brain negative for acute infarct  No further stroke work up.   Stroke Neurology will sign off   Please page with any addiitonal question.       The Stroke Staff is Dr. Angel.    Jay Jolly,  "MD  Vascular Neurology Fellow  To page me or covering stroke neurology team member, click here: AMCOM   Choose \"On Call\" tab at top, then search dropdown box for \"Neurology Adult\", select location, press Enter, then look for stroke/neuro ICU/telestroke.        "

## 2020-12-28 NOTE — ED PROVIDER NOTES
History   Chief Complaint  Aphasia    The history is provided by the EMS personnel. History limited by: dementia.      Emily Lockett is a 89 year old female with a history of dementia, hypertension, hyperlipidemia, and PE, who presents for evaluation of expressive aphasia. Per EMS report, the patient experienced a mechanical fall early this morning. She also has a wound on her forehead from a previous fall. After going back to sleep following this fall, she woke up at 0900. She then had physical therapy at 1000 where therapists noticed that she was very weak and unsteady on her feet. They attributed this to her chronic weakness; however, about an hour and a half ago at 1200, the patient started experiencing some slurred speech and right sided facial droop. EMS was called and transported her here for further evaluation. She was vitally stable en route and had a sugar of 114.      Review of Systems   Unable to perform ROS: Dementia   Skin: Positive for wound.   Neurological: Positive for facial asymmetry, speech difficulty and weakness.     Allergies  Codeine  Nsaids  Penicillins    Medications  Tenormin  Catapres  Furosemide  Gabapentin  Synthroid  Omeprazole  Zocor    Past Medical History  Acute pancreatitis  Hypertension  Fibrocystic breast  Hyperlipidemia  Osteoporosis  Vitamin B12 deficiency  Gout  FELICIA  Hyponatremia  Malignant neoplasm of skin  Mild TBI  PE  Stage 4 CKD  Dementia    Past Surgical History  Adenoidectomy  Cholecystectomy  Tonsillectomy  Cataract removal    Family History  Arthritis  Breast cancer    Social History  Presents to the ED via EMS.    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   ENT:      Nose:  The external nose is normal  Ears:  External ears are normal  Mouth/Throat: The oropharynx is normal    Mucus membranes are moist    Right facial droop  Neck:  Normal range of motion.      There is no rigidity.    Trachea is in the midline         CV:  Regular rate and rhythm    No murmur   Resp:  Breath sounds are clear bilaterally    Non-labored, no retractions or accessory muscle use      GI:  Abdomen is soft, no distension, no tenderness.       MS:  Normal strength in all 4 extremities  Skin:  Warm and dry, No rash or lesions noted.  Neuro: Strength 5/5 x4.  Sensation intact  In all 4 extremities.        Psych:  Awake. Alert.  Normal affect.      Appropriate interactions.    National Institutes of Health Stroke Scale  Exam Interval: Baseline   Score    Level of consciousness: (0)   Alert, keenly responsive    LOC questions: (0)   Answers both questions correctly    LOC commands: (0)   Performs both tasks correctly    Best gaze: (0)   Normal    Visual: (0)   No visual loss    Facial palsy: (1)   Minor paralysis (flat nasolabial fold, smile asymmetry)    Motor arm (left): (0)   No drift    Motor arm (right): (0)   No drift    Motor leg (left): (0)   No drift    Motor leg (right): (0)   No drift    Limb ataxia: (0)   Absent    Sensory: (0)   Normal- no sensory loss    Best language: (1)   Mild to moderate aphasia    Dysarthria: (1)   Mild to moderate dysarthria    Extinction and inattention: (0)   No abnormality        Total Score:  3       Emergency Department Course   EKG  Time: 1436  Rate 78 bpm. WA interval 168. QRS duration 80. QT/QTc 422/481. P-R-T axes 42 24 62.  Normal sinus rhythm. Nonspecific ST and T wave abnormality. Abnormal ECG.   Read at 1444.    Imaging:  CT Head without contrast:   Diffuse cerebral volume loss and cerebral white matter changes consistent with chronic small vessel ischemic disease.  No evidence for acute intracranial pathology. As per radiology.    CTA Head Neck with contrast:   1. Extensive calcified and noncalcified atherosclerotic plaque throughout the aortic arch.   2. Moderate atherosclerotic narrowing at the origin of the left subclavian artery.   3. Plaque without stenosis at the origins of the internal carotid arteries on both sides.   4. Tiny left vertebral artery demonstrating only intermittent flow likely due to high-grade proximal stenosis and multiple areas of atherosclerotic narrowing throughout.   5. Widely patent right vertebral artery.   6. Plaque without stenosis of the intracranial distal internal carotid arteries on both sides.   7. Moderate narrowing throughout the M1 segment of the left middle cerebral artery that is likely atherosclerotic in nature.   8. Multiple areas of moderate presumed atherosclerotic narrowing throughout the A2 segment of the right anterior cerebral artery.   9. Otherwise, normal head CTA. As per radiology.    CT Head Perfusion with contrast:   There is delayed arrival of the contrast bolus to the anterior aspect of the left temporal lobe with an associated area of decreased cerebral blood flow without an associated area of decreased cerebral blood volume. This is likely related to the presumed atherosclerotic narrowing of the M1 segment of the left middle cerebral artery. No definite infarct. No other perfusion defects. As per radiology.    MR Brain w/o and w/ contrast:   Pending    Laboratory:  BMP: Chloride 113 (H), Creatinine 1.22 (H), GFR estimate 37 (L), Calcium 8.2 (L), o/w WNL  CBC: WBC: 5.6, HGB: 7.4 (L), PLT: 88 (L)  INR: 1.08  Partial thromboplastin time: 42 (H)    UA with Microscopic: protein albumin 10 (A), o/w WNL    Asymptomatic SARS-CoV-2 COVID-19 Virus (Coronavirus) by PCR: pending      Emergency Department Course:  1330 I physically examined the patient as documented above.    1332 I called a code stroke.    1335 The patient was  sent to CT for head imaging.     1338 I consulted with the on call stroke neurologist regarding the patient's history and presentation here in the emergency department.    1405 I consulted with Dr. Sena, the reading radiologist, regarding the patient's CT results.    1408 I consulted with the on call stroke neurologist regarding the patient's history and presentation here in the emergency department.     The code stroke was deescalated.     Interventions:  1432 NS 0.5 L IV    Disposition:  The patient was admitted to the hospital under the care of Dr. Reilly.     Impression & Plan   CMS Diagnoses: The patient has stroke symptoms:         ED Stroke specific documentation           NIHSS PDF     Patient last known well time: 12/27 evening  ED Provider first to bedside at: arrival    tPA:   Not given due to head trauma/stroke within the past 3 months and minor/isolated/quickly resolving symptoms.    If treating with tPA: Ensure SBP<185 and DBP<105 prior to treatment with IV tPA.  Administering IV tPA after treatment with IV labetalol, hydralazine, or nicardipine is reasonable once BP control is established.    Endovascular Retrieval:  Not initiated due to absence of proximal vessel occlusion    National Institutes of Health Stroke Scale (Baseline)  See above    Stroke Mimics were considered (including migraine headache, seizure disorder, hypoglycemia (or hyperglycemia), head or spinal trauma, CNS infection, Toxin ingestion and shock state (e.g. sepsis) .    NIHSS unchanged after CT    Medical Decision Making:  Patient presents via EMS with concern for possible cerebrovascular infarction, code stroke was activated at arrival.  CT CT angios were performed as noted above and upon return from CT imaging exam is unchanged.  Given the unknown duration of symptoms patient's not a candidate for thrombolytics.  There is no signs of a large vessel occlusion.  Laboratory work-up demonstrates a stable anemia as well as chronic  kidney disease but no acute findings, no signs of acute infectious etiology.  I spoke with stroke neurology several times and at this point given the patient's lack of indications for acute treatment she will be admitted to the hospitalist service with an MRI pending.  I spoke with Dr. Reilly who is in agreement this plan of action.    Diagnosis:    ICD-10-CM    1. Aphasia  R47.01    2. Anemia, unspecified type  D64.9    3. Frequent falls  R29.6    4. Stage 3 chronic kidney disease, unspecified whether stage 3a or 3b CKD  N18.30          Scribe Disclosure:  I, Mehrdad Fishman, am serving as a scribe on 12/28/2020 at 1:43 PM to personally document services performed by Remy Rodriguez,* based on my observations and the provider's statements to me.        Remy Rodriguez MD  12/28/20 152

## 2020-12-28 NOTE — ED NOTES
Pt coming from assisted living via ambulance. Pt had a fall at 0530, she has had several falls recently due to increased weakness. Per EMS it was a mechanical fall. Pt was unstable at 1000, was too weak to do therapy. At 1200 staff noticed pt was having some expressive aphasia, garbled speech, and r facial droop. BG was 114

## 2020-12-28 NOTE — PROGRESS NOTES
RECEIVING UNIT ED HANDOFF REVIEW    ED Nurse Handoff Report was reviewed by: Maliha Gomes RN on December 28, 2020 at 3:56 PM

## 2020-12-28 NOTE — PHARMACY-ADMISSION MEDICATION HISTORY
Pharmacy Medication History  Admission medication history interview status for the 12/28/2020  admission is complete. See EPIC admission navigator for prior to admission medications     Location of Interview: Outside patient room but on unit  Medication history sources: [facility] ([facility]) and Care Everywhere  Medication history source reliability: Good  Adherence assessment: Good    Significant changes made to the medication list:    Medications no longer taking and removed:  -atenolol   -furosemide  -clonidine    In the past week, patient estimated taking medication this percent of the time: greater than 90%    Medication reconciliation completed by provider prior to medication history? No    Time spent in this activity: 10 minutes      Prior to Admission medications    Medication Sig Last Dose Taking? Auth Provider   acetaminophen (TYLENOL) 500 MG tablet Take 1,000 mg by mouth 2 times daily  MG every day PRN 12/28/2020 at am Yes Reported, Patient   amLODIPine (NORVASC) 2.5 MG tablet Take 2.5 mg by mouth daily 12/28/2020 at Unknown time Yes Unknown, Entered By History   aspirin (ASA) 81 MG chewable tablet Take 81 mg by mouth daily 12/28/2020 at Unknown time Yes Unknown, Entered By History   bimatoprost (LUMIGAN) 0.01 % SOLN Place 1 drop into both eyes At Bedtime 12/27/2020 at Unknown time Yes Reported, Patient   calcium carbonate (TUMS) 500 MG chewable tablet Take 1 chew tab by mouth as needed for heartburn 2-3 TIMES every day PRN Past Month at Unknown time Yes Reported, Patient   carboxymethylcellulose PF (REFRESH LIQUIGEL) 1 % ophthalmic gel Place 1 drop into both eyes 2 times daily  12/28/2020 at am Yes Reported, Patient   Cyanocobalamin (VITAMIN DEFICIENCY SYSTEM-B12) 1000 MCG/ML KIT Inject 1,000 mcg as directed every 28 days 12/26/2020 at Unknown time Yes Reported, Patient   dorzolamide (TRUSOPT) 2 % ophthalmic solution Place 1 drop into both eyes 2 times daily 12/28/2020 at am Yes Reported,  Patient   gabapentin (NEURONTIN) 300 MG capsule Take 300 mg by mouth At Bedtime 12/27/2020 at Unknown time Yes Reported, Patient   ketoconazole (NIZORAL) 2 % external shampoo Apply topically twice a week Past Week at Unknown time Yes Reported, Patient   lactobacillus rhamnosus, GG, (CULTURELL) capsule Take 1 capsule by mouth 2 times daily 12/28/2020 at am Yes Reported, Patient   levothyroxine (SYNTHROID/LEVOTHROID) 88 MCG tablet Take 88 mcg by mouth daily  12/28/2020 at am Yes Reported, Patient   loperamide (IMODIUM) 2 MG capsule Take 2 mg by mouth every 4 hours as needed for diarrhea MAX 2MG 3 XDAY Past Week at Unknown time Yes Reported, Patient   miconazole (MICATIN) 2 % AERP powder Apply topically 2 times daily as needed Apply to under breasts topically as needed for redness Past Week at Unknown time Yes Reported, Patient   Multiple Vitamins-Minerals (PRESERVISION AREDS) CAPS Take 1 capsule by mouth 2 times daily 12/28/2020 at am Yes Reported, Patient   omeprazole (PRILOSEC) 20 MG DR capsule Take 20 mg by mouth daily 12/28/2020 at am Yes Reported, Patient   QUEtiapine (SEROQUEL) 25 MG tablet Take 12.5 mg by mouth At Bedtime 12/27/2020 at Unknown time Yes Unknown, Entered By History   simvastatin (ZOCOR) 20 MG tablet Take 20 mg by mouth At Bedtime 12/27/2020 at Unknown time Yes Reported, Patient   timolol maleate (TIMOPTIC) 0.5 % ophthalmic solution Place 1 drop into both eyes 2 times daily 12/28/2020 at am Yes Reported, Patient   triamcinolone (KENALOG) 0.1 % external cream Apply topically 2 times daily as needed for irritation Past Week at Unknown time Yes Reported, Patient

## 2020-12-29 ENCOUNTER — APPOINTMENT (OUTPATIENT)
Dept: GENERAL RADIOLOGY | Facility: CLINIC | Age: 85
DRG: 056 | End: 2020-12-29
Attending: PHYSICIAN ASSISTANT
Payer: MEDICARE

## 2020-12-29 ENCOUNTER — APPOINTMENT (OUTPATIENT)
Dept: SPEECH THERAPY | Facility: CLINIC | Age: 85
DRG: 056 | End: 2020-12-29
Attending: INTERNAL MEDICINE
Payer: MEDICARE

## 2020-12-29 LAB
ANION GAP SERPL CALCULATED.3IONS-SCNC: 6 MMOL/L (ref 3–14)
BUN SERPL-MCNC: 27 MG/DL (ref 7–30)
CALCIUM SERPL-MCNC: 8.4 MG/DL (ref 8.5–10.1)
CHLORIDE SERPL-SCNC: 117 MMOL/L (ref 94–109)
CO2 SERPL-SCNC: 19 MMOL/L (ref 20–32)
CREAT SERPL-MCNC: 1.14 MG/DL (ref 0.52–1.04)
ERYTHROCYTE [DISTWIDTH] IN BLOOD BY AUTOMATED COUNT: 14.6 % (ref 10–15)
GFR SERPL CREATININE-BSD FRML MDRD: 40 ML/MIN/{1.73_M2}
GLUCOSE BLDC GLUCOMTR-MCNC: 101 MG/DL (ref 70–99)
GLUCOSE BLDC GLUCOMTR-MCNC: 112 MG/DL (ref 70–99)
GLUCOSE BLDC GLUCOMTR-MCNC: 143 MG/DL (ref 70–99)
GLUCOSE BLDC GLUCOMTR-MCNC: 56 MG/DL (ref 70–99)
GLUCOSE BLDC GLUCOMTR-MCNC: 60 MG/DL (ref 70–99)
GLUCOSE BLDC GLUCOMTR-MCNC: 63 MG/DL (ref 70–99)
GLUCOSE BLDC GLUCOMTR-MCNC: 70 MG/DL (ref 70–99)
GLUCOSE BLDC GLUCOMTR-MCNC: 71 MG/DL (ref 70–99)
GLUCOSE BLDC GLUCOMTR-MCNC: 76 MG/DL (ref 70–99)
GLUCOSE BLDC GLUCOMTR-MCNC: 78 MG/DL (ref 70–99)
GLUCOSE BLDC GLUCOMTR-MCNC: 78 MG/DL (ref 70–99)
GLUCOSE BLDC GLUCOMTR-MCNC: 81 MG/DL (ref 70–99)
GLUCOSE BLDC GLUCOMTR-MCNC: 82 MG/DL (ref 70–99)
GLUCOSE BLDC GLUCOMTR-MCNC: 82 MG/DL (ref 70–99)
GLUCOSE BLDC GLUCOMTR-MCNC: 84 MG/DL (ref 70–99)
GLUCOSE BLDC GLUCOMTR-MCNC: 85 MG/DL (ref 70–99)
GLUCOSE BLDC GLUCOMTR-MCNC: 85 MG/DL (ref 70–99)
GLUCOSE SERPL-MCNC: 138 MG/DL (ref 70–99)
HCT VFR BLD AUTO: 24.1 % (ref 35–47)
HGB BLD-MCNC: 7.7 G/DL (ref 11.7–15.7)
IRON SATN MFR SERPL: 12 % (ref 15–46)
IRON SERPL-MCNC: 26 UG/DL (ref 35–180)
MCH RBC QN AUTO: 32.4 PG (ref 26.5–33)
MCHC RBC AUTO-ENTMCNC: 32 G/DL (ref 31.5–36.5)
MCV RBC AUTO: 101 FL (ref 78–100)
PLATELET # BLD AUTO: 92 10E9/L (ref 150–450)
POTASSIUM SERPL-SCNC: 4 MMOL/L (ref 3.4–5.3)
RBC # BLD AUTO: 2.38 10E12/L (ref 3.8–5.2)
SODIUM SERPL-SCNC: 142 MMOL/L (ref 133–144)
TIBC SERPL-MCNC: 224 UG/DL (ref 240–430)
WBC # BLD AUTO: 5.7 10E9/L (ref 4–11)

## 2020-12-29 PROCEDURE — G0378 HOSPITAL OBSERVATION PER HR: HCPCS

## 2020-12-29 PROCEDURE — 80048 BASIC METABOLIC PNL TOTAL CA: CPT | Performed by: INTERNAL MEDICINE

## 2020-12-29 PROCEDURE — 92610 EVALUATE SWALLOWING FUNCTION: CPT | Mod: GN | Performed by: SPEECH-LANGUAGE PATHOLOGIST

## 2020-12-29 PROCEDURE — 999N000128 HC STATISTIC PERIPHERAL IV START W/O US GUIDANCE

## 2020-12-29 PROCEDURE — 71045 X-RAY EXAM CHEST 1 VIEW: CPT

## 2020-12-29 PROCEDURE — 999N001017 HC STATISTIC GLUCOSE BY METER IP

## 2020-12-29 PROCEDURE — 250N000013 HC RX MED GY IP 250 OP 250 PS 637: Performed by: INTERNAL MEDICINE

## 2020-12-29 PROCEDURE — 36415 COLL VENOUS BLD VENIPUNCTURE: CPT | Performed by: INTERNAL MEDICINE

## 2020-12-29 PROCEDURE — 258N000001 HC RX 258: Performed by: INTERNAL MEDICINE

## 2020-12-29 PROCEDURE — 258N000001 HC RX 258: Performed by: PHYSICIAN ASSISTANT

## 2020-12-29 PROCEDURE — 83540 ASSAY OF IRON: CPT | Performed by: INTERNAL MEDICINE

## 2020-12-29 PROCEDURE — 85027 COMPLETE CBC AUTOMATED: CPT | Performed by: INTERNAL MEDICINE

## 2020-12-29 PROCEDURE — 99207 PR CDG-CODE CATEGORY CHANGED: CPT | Performed by: INTERNAL MEDICINE

## 2020-12-29 PROCEDURE — 120N000001 HC R&B MED SURG/OB

## 2020-12-29 PROCEDURE — 92526 ORAL FUNCTION THERAPY: CPT | Mod: GN | Performed by: SPEECH-LANGUAGE PATHOLOGIST

## 2020-12-29 PROCEDURE — 99233 SBSQ HOSP IP/OBS HIGH 50: CPT | Performed by: INTERNAL MEDICINE

## 2020-12-29 PROCEDURE — 258N000003 HC RX IP 258 OP 636: Performed by: INTERNAL MEDICINE

## 2020-12-29 PROCEDURE — 250N000009 HC RX 250: Performed by: INTERNAL MEDICINE

## 2020-12-29 PROCEDURE — 83550 IRON BINDING TEST: CPT | Performed by: INTERNAL MEDICINE

## 2020-12-29 RX ORDER — FERROUS SULFATE 325(65) MG
325 TABLET ORAL DAILY
Status: DISCONTINUED | OUTPATIENT
Start: 2020-12-29 | End: 2021-01-01 | Stop reason: HOSPADM

## 2020-12-29 RX ORDER — DORZOLAMIDE HCL 20 MG/ML
1 SOLUTION/ DROPS OPHTHALMIC 2 TIMES DAILY
Status: DISCONTINUED | OUTPATIENT
Start: 2020-12-29 | End: 2021-01-01 | Stop reason: HOSPADM

## 2020-12-29 RX ORDER — AMLODIPINE BESYLATE 2.5 MG/1
2.5 TABLET ORAL DAILY
Status: DISCONTINUED | OUTPATIENT
Start: 2020-12-30 | End: 2021-01-01 | Stop reason: HOSPADM

## 2020-12-29 RX ORDER — TAMSULOSIN HYDROCHLORIDE 0.4 MG/1
0.4 CAPSULE ORAL
Status: DISCONTINUED | OUTPATIENT
Start: 2020-12-29 | End: 2021-01-01 | Stop reason: HOSPADM

## 2020-12-29 RX ORDER — ASPIRIN 81 MG/1
81 TABLET, CHEWABLE ORAL DAILY
Status: DISCONTINUED | OUTPATIENT
Start: 2020-12-30 | End: 2021-01-01 | Stop reason: HOSPADM

## 2020-12-29 RX ADMIN — QUETIAPINE 12.5 MG: 25 TABLET, FILM COATED ORAL at 22:18

## 2020-12-29 RX ADMIN — TIMOLOL MALEATE 1 DROP: 5 SOLUTION/ DROPS OPHTHALMIC at 21:57

## 2020-12-29 RX ADMIN — DEXTROSE MONOHYDRATE 25 ML: 500 INJECTION PARENTERAL at 11:37

## 2020-12-29 RX ADMIN — DEXTROSE MONOHYDRATE 50 ML: 500 INJECTION PARENTERAL at 01:24

## 2020-12-29 RX ADMIN — SODIUM CHLORIDE: 234 INJECTION INTRAMUSCULAR; INTRAVENOUS; SUBCUTANEOUS at 12:37

## 2020-12-29 RX ADMIN — DEXTROSE MONOHYDRATE 25 ML: 500 INJECTION PARENTERAL at 07:07

## 2020-12-29 RX ADMIN — TAMSULOSIN HYDROCHLORIDE 0.4 MG: 0.4 CAPSULE ORAL at 23:09

## 2020-12-29 RX ADMIN — BIMATOPROST 1 DROP: 0.1 SOLUTION/ DROPS OPHTHALMIC at 22:17

## 2020-12-29 RX ADMIN — DORZOLAMIDE HCL 1 DROP: 20 SOLUTION/ DROPS OPHTHALMIC at 23:08

## 2020-12-29 RX ADMIN — FERROUS SULFATE TAB 325 MG (65 MG ELEMENTAL FE) 325 MG: 325 (65 FE) TAB at 14:01

## 2020-12-29 RX ADMIN — DEXTROSE MONOHYDRATE 25 ML: 500 INJECTION PARENTERAL at 09:57

## 2020-12-29 RX ADMIN — DEXTROSE AND SODIUM CHLORIDE: 5; 900 INJECTION, SOLUTION INTRAVENOUS at 01:38

## 2020-12-29 RX ADMIN — DEXTROSE AND SODIUM CHLORIDE: 5; 450 INJECTION, SOLUTION INTRAVENOUS at 10:14

## 2020-12-29 RX ADMIN — TIMOLOL MALEATE 1 DROP: 5 SOLUTION/ DROPS OPHTHALMIC at 08:33

## 2020-12-29 ASSESSMENT — ACTIVITIES OF DAILY LIVING (ADL)
ADLS_ACUITY_SCORE: 21
ADLS_ACUITY_SCORE: 19

## 2020-12-29 NOTE — H&P
Admitted:     12/28/2020      PRIMARY CARE PHYSICIAN:  Maksim Castano M.D. at [facility].      CHIEF COMPLAINT:  Difficulty speaking.      HISTORY OF PRESENT ILLNESS:   Emily Lockett is a 89-year-old  female with history of moderate dementia, hypertension, hyperlipidemia, history of PE, who was initially admitted to [facility] Emergency Department for possible stroke.  According to the EMS report, the patient had a mechanical fall earlier in the morning and she had an abrasion of her forehead.  After the fall, she went back to sleep and woke up and was doing therapy around 10:00 when the therapist noted that she was very weak and unsteady on her feet.  They thought this was due to chronic weakness.  However, about an hour and a half later, she started having some difficulty with her speech and some slurred words as well as right facial droop.  EMS was contacted and the patient was transported to [facility].  Blood sugars were 114.      In the Emergency Department, the patient was afebrile.  Blood pressure was 155/70.  Normal oxygen saturations.  Blood work revealed normal electrolytes, BUN of 29, creatinine 1.22 with calculated GFR 37, which is typically her baseline.  White count 5.6, hemoglobin 7.4, platelets of 88.  Her previous hemoglobin was 8.4.  Platelets were 130.  Urinalysis had specific gravity 1.015 with less than 1 white cell.  COVID-19 test was negative.  The patient had a head CT without contrast, which showed cerebral volume loss and white matter changes, but nothing acute.  CT angio of the head and neck showed extensive calcified and noncalcified atherosclerotic plaque throughout the aortic arch, moderate atherosclerotic narrowing at the origin of the left subclavian artery and plaque without stenosis at the origin of the internal carotid arteries on both sides.  There is tiny left vertebral artery demonstrated with only intermittent low, likely due to high-grade  proximal stenosis in multiple areas of atherosclerotic narrowing throughout.  There is widely patent right vertebral artery.  There is plaque without stenosis in the intracranial distal internal carotid arteries on both sides and moderate narrowing throughout M1 segment of the left MCA, likely atherosclerotic in nature.  There are multiple areas of moderate atherosclerotic narrowing throughout the A2 segment of the right anterior cerebral artery, otherwise normal  head CT.  Head CT with perfusion showed some delayed contrast bolus in the left temporal lobe with associated decrease in cerebral blood flow without an associated decrease in cerebral blood volume.  No node definite infarct.  No other perfusion defects noted.  The patient eventually underwent MRI which showed no evidence of acute infarction, mass, hemorrhage or herniation, mild diffuse parenchymal volume loss and chronic appearing cortical encephalomalacia in the inferior left occipital lobe.  This case was discussed with the stroke consultant and they felt that the patient did not have any acute stroke and have now since signed off.  The patient is being admitted under observation status.      The patient has had a significant amount of setbacks recently, the patient was admitted from 12/02-12/-9 at [facility] with a UTI.  The patient also was recently admitted at [facility] from 09/02 through 09/04 for syncope.  She has had multiple falls with compression fractures.  The patient is made DNR/DNI on 09/20/2020 without any escalation of care including no ICU, no pacemaker, no heart catheterization.  She also had an echocardiogram on 12/03 with an EF showing a 40%-45% without any left atrial enlargement and normal RV size and function.  Given her symptoms, it was felt that this is likely due to her dementia and her speech difficulties have more to do with her dementia than any sort of stroke or TIA, although it is not  entirely impossible to rule out a TIA given multiple atherosclerotic nature of her cerebral vasculature.  The patient is therefore being admitted under observation status and likely be discharged back tomorrow to her long-term care facility.      PAST MEDICAL HISTORY:   1.  Acute pancreatitis.   2.  Hypertension.   3.  Fibrocystic breast disease.   4.  Hyperlipidemia.   5.  Osteoporosis.   6.  History of lumbar compression fracture.   7.  B12 deficiency   8.  Gout.   9.  Acute kidney injury and hyponatremia.   10.  Mild traumatic brain injury.   11.  History of pulmonary embolism.   12.  Stage IV chronic kidney disease.   13.  Dementia.   14.  Hyponatremia.   15.  Osteoporosis.   16.  History of peripheral neuropathy.      PAST SURGICAL HISTORY:  D and C, cholecystectomy, tonsillectomy, adenoidectomy, vitrectomy, cataract removal.      FAMILY HISTORY:  Father with stroke.  Paternal aunt with breast cancer, mother with arthritis.      SOCIAL HISTORY:  No tobacco, no alcohol.  She is DNR/DNI.      ALLERGIES:  CODEINE, ANCEF AND PENICILLIN.      CURRENT MEDICATION LIST:  Unverified, includes:   1.  Trusopt.   2.  Refresh eyedrops.   3.  Tums.   4.  Desonide ointment.   5.  Bacitracin.   6.  Tylenol.   7.  Culturelle.   8.  Gabapentin.   9.  PreserVision.   10.  Vitamin B12 injection every 4 weeks.   11.  Prilosec   12.  Simvastatin    13.  Triamcinolone cream.   14.  Ketoconazole 2% shampoo.   15.  Miconazole 2% powder.   16.  Timolol eyedrops.   17.  Lumigan eyedrops.   18.  Loperamide.   19.  Levothyroxine.   20.  Aspirin.   21.  Amlodipine   22.  Quetiapine.      REVIEW OF SYSTEMS:  Unable to provide due to her dementia.      PHYSICAL EXAMINATION:   VITAL SIGNS:  Temperature 97.9, heart rate 89, respirations 17, blood pressure 155/70, sats are 98% on room air.   GENERAL:  The patient is a 89-year-old  female.  She is pleasant, calm and able to follow simple commands.   HEENT:  She has an abrasion over her  right forehead and she has also bruising over her eyebrows bilaterally and some facial bruising as well.  Pupils are equal.  Sclerae are anicteric.  Mucous membranes are moist.   NECK:  JVP is not elevated.   PULMONARY:  Lungs are clear to auscultation.   CARDIOVASCULAR:  S1, S2, regular rate and rhythm.   ABDOMEN:  Soft, nontender, normoactive bowel sounds.   MUSCULOSKELETAL:  No edema.     NEUROLOGIC:  She has some speech difficulties baseline, unable to complete full sentences.  She is able to squeeze both her hands equally to command.  She is able to move her lower extremities without any deficits.  Cranial nerves appear to be grossly intact, without any facial droop.   SKIN:  Warm, dry, well perfused.   PSYCHIATRIC:  Unable to assess, but she is not agitated.      LABORATORY DATA:  As dictated in history of present illness.      ASSESSMENT:  Emily Lockett is 89 who has had multiple falls and has had decrease in her overall health over the last few months, admitted with transient difficulty with her speech with a full stroke workup being negative, but showing numerous multiple areas of atherosclerotic disease in her neck and in her cerebral vessels.  With stroke workup being negative, being admitted under observation status and would likely be discharged back to long-term care facility.      PLAN:   1.  Transient speech difficulties.  The patient had a negative stroke workup including head CT, CT with perfusion and MRI.  She was seen by Neurology and felt this is likely not stroke and more likely related to her dementia.  The patient is going to be discharged back to transitional care unit tomorrow after seen by physical therapy.  She also undergoes therapy at her long-term care facility.  The patient did receive aspirin suppository.  She had an echocardiogram done on 12/03/2020 at [facility].  Her ejection fraction was 40%-45%.  This systems reviewed, so will not repeat and will not pursue and continue  other stroke orders as well.   2.  Anemia.  Her hemoglobin is down to 7.4 from 8.4.  We will repeat her hemoglobin.  If it is closer to 7,  she may benefit from a blood transfusion for symptomatic treatment due to her weakness and falls.  The patient's hemoglobin has decreased from the mid-9 to mid-8 range.  Based on family wishes, the patient likely will not need any sort of gastrointestinal workup, but symptomatic treatment.  We will check iron studies.   3.  Hypertension.  We will continue the patient on amlodipine.  We will continue the patient also on her aspirin.   4.  History of dementia.  We will continue the patient on her Seroquel 12.5 mg at bedtime.   5.  Hypothyroidism.  We will hold the patient's levothyroxine.   6.  History of glaucoma.  We will hold all her eyedrops, expecting a discharge tomorrow.   7.  Reflux disease.  We will continue the patient on her omeprazole.   8.  Deep venous thrombosis prophylaxis.  The patient will have compression boots.   9.  CODE STATUS:  DNR/DNI.   10.  Observation status.         GIRISH ARCE MD             D: 2020   T: 2020   MT: KIMBERLY      Name:     CHANO    MRN:      [id number]        Account:      [id number]   :      1923        Admitted:     2020                   Document: [id number]       cc: Maksim Castano MD

## 2020-12-29 NOTE — PROGRESS NOTES
PRIMARY DIAGNOSIS: SYNCOPE/TIA  OUTPATIENT/OBSERVATION GOALS TO BE MET BEFORE DISCHARGE:  1. Orthostatic performed: NA    2. Diagnostic testing complete & at baseline neurologic testing: NO    3. Cleared by consultants (if involved): NO    4. Interpretation of cardiac rhythm per telemetry tech: SR    5. Tolerating adequate PO diet and medications:NO     6. Return to near baseline physical activity or neurologic status:NO    Discharge Planner Nurse   Safe discharge environment identified:NO  Barriers to discharge:NO      Entered by: Maliha Gomes 12/28/2020 11:20 PM     Please review provider order for any additional goals.   Nurse to notify provider when observation goals have been met and patient is ready for discharge.

## 2020-12-29 NOTE — PROGRESS NOTES
PRIMARY DIAGNOSIS: SYNCOPE/TIA  OUTPATIENT/OBSERVATION GOALS TO BE MET BEFORE DISCHARGE:  1. Orthostatic performed: N/A    2. Diagnostic testing complete & at baseline neurologic testing: No    3. Cleared by consultants (if involved): No    4. Interpretation of cardiac rhythm per telemetry tech: SR/ST    5. Tolerating adequate PO diet and medications: NPO     6. Return to near baseline physical activity or neurologic status: No    Discharge Planner Nurse   Safe discharge environment identified: No  Barriers to discharge: Yes       Entered by: Lexi Moreau 12/29/2020 6:43 AM     Please review provider order for any additional goals.   Nurse to notify provider when observation goals have been met and patient is ready for discharge.

## 2020-12-29 NOTE — PROVIDER NOTIFICATION
MD Notification    Notified Person: MD    Notified Person Name: Dr. Cintron    Notification Date/Time: 12/29/2020 @ 06:13    Notification Interaction: pager    Purpose of Notification: BG decreasing last check 71, on IV fluids. Any other suggestion?    Orders Received: Will continue to monitor. BG and if needed prn dextrose 50 for hypoglycemia     Comments:

## 2020-12-29 NOTE — PROGRESS NOTES
"IV team paged: \"Pt needs new IV in obs 19 for IV D5.5Na, for persistent hypoglycemia. please help.  Thank you\"  "

## 2020-12-29 NOTE — PROGRESS NOTES
[facility]    HOSPITALIST PROGRESS NOTE :   --------------------------------------------------    Date of Admission:  12/28/2020    Cumulative Summary: Emily Lockett is a 89 year old female with complicated past medical history significant for moderate dementia, essential hypertension, hyperlipidemia, history of PE who presented to ER due to the concern for possible stroke associated with slurred speech and right..  Patient underwent a very thorough stroke work-up which was negative and was admitted for further evaluation and management.    Assessment & Plan     Transient speech difficulties: Most likely secondary to combination of hypoglycemia , anemia along with underlying severe dementia  Patient presented to ED due to the concern for slurred speech and possible right facial droop.  Patient was evaluated by stroke neurology and code stroke was activated.  Patient underwent head CT, CT scan of head with perfusion and MRI of the brain.  Had a stroke work-up came back negative and neurology felt her symptoms were more likely related to dementia.  After admission, patient was also noticed to have persistent hypoglycemia requiring D5 infusion and so far has also received current dextrose 4 times after the admission.  She was also noticed to have improvement in her speech after her blood sugars were improved.  She does not have any other focal neurological deficits this morning and seems to be answering appropriately also her comprehension is significantly impaired secondary to underlying dementia.  She was not able to provide any meaningful information to me.  Frequent falls:     --Continue to monitor patient closely.  --Continue patient on dysphagia level 1 diet which is a started after speech therapy evaluation.  --Patient will also be evaluated by physical and Occupational Therapy, currently she is a resident of long-term care facility.  --As above, patient has undergone detailed stroke  work-up which came back negative, she had recent echocardiogram done earlier this month on December 3 which showed ejection fraction of 40 to 45%, patient does not need any repeat echocardiogram at this point.  --Due to patient requiring multiple as needed D5 actions, will also check her IV access to make sure patient is receiving IV fluids.  --We will change her IV fluids to D10 half-normal saline at 100 mL/h.  --We will continue to monitor patient closely and will evaluate if patient is able to maintain her blood sugars once she is off the IV fluids.    Urinary retention: Discussed with bedside nursing, so far patient was noticed to have close to 300 ml bladder scan, unfortunately she is unable to provide any significant history.    --We will go ahead and will order as needed bladder scan and intermittent catheterization.  --If patient continues to have issues with urinary retention, will benefit from getting started on Flomax.  --Will avoid Mora catheter at this point and tenderness patient is requiring multiple attempts of intermittent cath.    Anemia: Was found to have hemoglobin of 7.4 which was down from her previous hemoglobin of 8.4.seems to have iron deficiency but also likely has some component of anemia of chronic disease with low iron binding capacity    --Hemoglobin was repeated this morning which is up to 7.7, in agreement with the plan for not pursuing with more invasive gastroenterology work-up per family wishes considering her advanced age and dementia.  --Start patient on ferrous sulfate 325 mg p.o. daily for a month.    Essential hypertension  --Continue patient on PTA amlodipine  --Continue patient on PTA aspirin.    Alzheimer's dementia: Patient seems to have significant Janes advanced dementia and was not even able to tell me her name or birthday.  Of note, significant advanced dementia can also result in speech difficulties and poor oral intake which might explain her presentation.  --Continue  Seroquel 12.5 mg at bedtime.    Hypothyroidism  --Hold PTA levothyroxine due to observation is status., if patient is able to be discharged tomorrow then will restart on discharge.    History of glaucoma:   --On her PTA Lumigan, dorzolamide and timolol eyedrops.    GERD  --Continue PTA omeprazole.    Stage 3 chronic kidney disease, unspecified whether stage 3a or 3b CKD (12/28/2020)  -- stable, continue to monitor     Diet: Dysphagia Diet Level 1 Pureed Nectar Thickened Liquids (pre-thickened or use instant food thickener)    Mora Catheter: not present  DVT Prophylaxis: Pneumatic Compression Devices  Code Status: No CPR- Do NOT Intubate    The patient's care was discussed with the Bedside Nurse and Patient.    Disposition Plan   Expected discharge: 1-2 days , will probably return to her long term care facility     More than 35 min of the time was spend in care today, more than 50% of the time was spent in patient care coordination and counseling.      Kesha Velasquez MD, FACP  Text Page (7am - 6pm)    ----------------------------------------------------------------------------------------------------------------------    Interval History   Patient care was assumed this morning, patient was seen and examined, sitting in bed, nursing aide is helping with feeding, patient is pleasant, following commands but was not able to provide any history and was not even able to tell me her name or birthday.  She told me that she is feeling much better.  Her speech seems to be significantly improved as compared to her presentation.  Plan of care was discussed in detail with bedside nursing.    -Data reviewed today: I reviewed all new labs and imaging results over the last 24 hours.    I personally reviewed CT scan of the head without contrast, perfusion CT and MRI of the brain which was negative for any ischemic work-up.    Physical Exam   Temp: 94.1  F (34.5  C) Temp src: Axillary BP: 136/63 Pulse: 85   Resp: 14 SpO2: 97 % O2  Device: None (Room air)    Vitals:    12/28/20 1334   Weight: 45.4 kg (100 lb)     Vital Signs with Ranges  Temp:  [94.1  F (34.5  C)-97.9  F (36.6  C)] 94.1  F (34.5  C)  Pulse:  [73-98] 85  Resp:  [11-17] 14  BP: ()/(33-70) 136/63  SpO2:  [91 %-99 %] 97 %  No intake/output data recorded.    GENERAL: Alert and awake , not oriented to time, place or person but very pleasant and cooperative ,Conversational, appropriate.   HEENT: Normocephalic. EOMI. No icterus or injection. Nares normal.   LUNGS: Clear to auscultation. No dyspnea at rest.   HEART: Regular rate. Extremities perfused.   ABDOMEN: Soft, nontender, and nondistended. Positive bowel sounds.   EXTREMITIES: No LE edema noted.   NEUROLOGIC: Moves extremities x4 on command. No acute focal neurologic abnormalities noted.     Medications     dextrose 5% and 0.45% NaCl 100 mL/hr at 12/29/20 1014     - MEDICATION INSTRUCTIONS -         bimatoprost  1 drop Both Eyes At Bedtime     sodium chloride (PF)  3 mL Intracatheter Q8H     timolol maleate  1 drop Both Eyes BID       Data   Recent Labs   Lab 12/29/20  0716 12/29/20  0715 12/28/20  1408   WBC  --  5.7 5.6   HGB  --  7.7* 7.4*   MCV  --  101* 104*   PLT  --  92* 88*   INR  --   --  1.08     --  139   POTASSIUM 4.0  --  4.0   CHLORIDE 117*  --  113*   CO2 19*  --  20   BUN 27  --  29   CR 1.14*  --  1.22*   ANIONGAP 6  --  6   IMAN 8.4*  --  8.2*   *  --  93   ALBUMIN  --   --  2.1*   PROTTOTAL  --   --  4.9*   BILITOTAL  --   --  <0.1*   ALKPHOS  --   --  78   ALT  --   --  17   AST  --   --  10       Imaging:   Recent Results (from the past 24 hour(s))   CT Head w/o Contrast    Narrative    CT OF THE HEAD WITHOUT CONTRAST 12/28/2020 1:43 PM     COMPARISON: None    HISTORY: Garbled speech. Right facial droop.    TECHNIQUE: 5 mm thick axial CT images of the head were acquired  without IV contrast material.    FINDINGS:  There is moderate diffuse cerebral volume loss. There are  subtle patchy  areas of decreased density in the cerebral white matter  bilaterally that are consistent with sequela of chronic small vessel  ischemic disease.     The ventricles and basal cisterns are within normal limits in  configuration given the degree of cerebral volume loss.  There is no  midline shift. There are no extra-axial fluid collections.     No intracranial hemorrhage, mass or recent infarct.    The visualized paranasal sinuses are well-aerated. There is no  mastoiditis. There are no fractures of the visualized bones.       Impression    IMPRESSION:  Diffuse cerebral volume loss and cerebral white matter  changes consistent with chronic small vessel ischemic disease. No  evidence for acute intracranial pathology.      Radiation dose for this scan was reduced using automated exposure  control, adjustment of the mA and/or kV according to patient size, or  iterative reconstruction technique.    ROXANN SANTIZO MD   CTA Head Neck with Contrast    Narrative    CT ANGIOGRAM OF THE HEAD AND NECK WITHOUT AND WITH CONTRAST   12/28/2020 1:48 PM     COMPARISON: None    HISTORY: Garbled speech. Right facial droop.    TECHNIQUE:  Precontrast localizing scans were followed by CT  angiography with an injection of 70 mL Isovue-370 nonionic intravenous  contrast material with scans through the head and neck.  Images were  transferred to a separate 3-D workstation where multiplanar  reformations and 3-D images were created.  Estimates of carotid  stenoses are made relative to the distal internal carotid artery  diameters except as noted.      FINDINGS:   Neck CTA: There is severe calcified and noncalcified atherosclerotic  plaque throughout the aortic arch. There is moderate atherosclerotic  narrowing of the origin of the left subclavian artery. The arch  origins of the great vessels are otherwise patent without stenosis.  Common carotid arteries bilaterally are patent without stenosis. There  is calcified plaque at the origins of  the internal carotid arteries on  both sides that does not result in any significant vascular narrowing  on either side. The cervical internal carotid arteries bilaterally are  otherwise patent and unremarkable. The right vertebral artery is  widely patent without vascular narrowing. The left vertebral artery is  tiny likely due to origin stenosis and demonstrates only intermittent  flow in the V1 and proximal V2 segments as well as small V3 and V4  segments, likely due to a combination of proximal stenosis and diffuse  atherosclerotic plaque.    Head CTA: There is heavily calcified atherosclerotic plaque of the  intracranial distal internal carotid arteries on both sides that does  not result in any significant narrowing on either side. The basilar  artery is patent and unremarkable. There are multiple foci of moderate  presumed atherosclerotic narrowing throughout the A2 segment of the  right anterior cerebral artery. The anterior cerebral arteries  bilaterally are otherwise patent and unremarkable. There is diffuse  moderate vascular narrowing throughout the M1 segment of the left  middle cerebral artery that is likely atherosclerotic in nature. The  left middle cerebral artery and its branches are otherwise patent with  no evidence for occluded vessels. The right middle cerebral and  bilateral posterior cerebral arteries are patent and unremarkable. The  anterior communicating artery is patent.      Impression    IMPRESSION:  1. Extensive calcified and noncalcified atherosclerotic plaque  throughout the aortic arch.  2. Moderate atherosclerotic narrowing at the origin of the left  subclavian artery.  3. Plaque without stenosis at the origins of the internal carotid  arteries on both sides.  4. Tiny left vertebral artery demonstrating only intermittent flow  likely due to high-grade proximal stenosis and multiple areas of  atherosclerotic narrowing throughout.  5. Widely patent right vertebral artery.  6. Plaque  without stenosis of the intracranial distal internal carotid  arteries on both sides.  7. Moderate narrowing throughout the M1 segment of the left middle  cerebral artery that is likely atherosclerotic in nature.  8. Multiple areas of moderate presumed atherosclerotic narrowing  throughout the A2 segment of the right anterior cerebral artery.  9. Otherwise, normal head CTA.    Radiation dose for this scan was reduced using automated exposure  control, adjustment of the mA and/or kV according to patient size, or  iterative reconstruction technique.    ROXANN SANTIZO MD   CT Head Perfusion w Contrast    Narrative    CT BRAIN PERFUSION 12/28/2020 1:59 PM    COMPARISON: None.    HISTORY: Garbled speech. Right facial droop.    TECHNIQUE: Time sequential axial CT images of the head were acquired  during the administration of intravenous contrast (50 mL Isovue-370).  CTA images of the [place] as well as color perfusion maps of  the brain were created from this time sequential axial source data.      Impression    IMPRESSION: There is delayed arrival of the contrast bolus to the  anterior aspect of the left temporal lobe with an associated area of  decreased cerebral blood flow without an associated area of decreased  cerebral blood volume. This is likely related to the presumed  atherosclerotic narrowing of the M1 segment of the left middle  cerebral artery. No definite infarct. No other perfusion defects.      Radiation dose for this scan was reduced using automated exposure  control, adjustment of the mA and/or kV according to patient size, or  iterative reconstruction technique.    ROXNAN SANTIZO MD   MR Brain w/o & w Contrast    Narrative    MRI BRAIN WITHOUT AND WITH CONTRAST  12/28/2020 5:08 PM     HISTORY:  Focal neurologic deficit, greater than 6 hours, stroke  suspected.    TECHNIQUE:  Multiplanar, multisequence MRI of the brain without and  with 4.5mL Gadavist.    COMPARISON: Head CT from earlier the  same day.     FINDINGS: No restricted diffusion to suggest acute infarct. No mass  effect or midline shift. No evidence of acute intracranial hemorrhage.  Chronic-appearing cortical encephalomalacia in the inferior left  occipital lobe. Mild diffuse parenchymal volume loss. Patchy  periventricular white matter T2 hyperintensities are nonspecific, but  likely related to chronic microvascular ischemic disease. Ventricular  size is within normal limits without evidence of hydrocephalus.    There is no abnormal intracranial enhancement.     The facial structures appear normal.       Impression    IMPRESSION:    1. No evidence of acute infarct, mass, hemorrhage, or herniation.  2. Mild diffuse parenchymal volume loss and white matter changes  likely due to chronic microvascular ischemic disease.  3. Chronic-appearing cortical encephalomalacia in the inferior left  occipital lobe.    MABEL SORENSON MD

## 2020-12-29 NOTE — PROVIDER NOTIFICATION
MD Notification    Notified Person: MD    Notified Person Name:Dr. Velasquez     Notification Date/Time:12/29/2020 1001    Notification Interaction:weg paged     Purpose of Notification: low blood sugar levels    Orders Received:yes    Comments: Patient blood sugar levels still dropping despite the start of D5NS  IV fluids.  IV fluid orders changed to D5 45%NS.  Nursing to monitor blood sugars per DM protocol.

## 2020-12-29 NOTE — PLAN OF CARE
Shift report [id number]. Pt confused. At begin of shift low BP's and BG 23, prn D50. MD notified, change of IV fluids. VSS on RA. BG decreasing from 112, 82, 82, and 71. MD updated, see note. NPO due to failed dysphagia screening. Neuro checks q 4hrs.  Slight right facial droopiness. Ambulates Ax2, [place]er [place] [facility] c/o dizziness. Unstable gait. Plan: neurology consult, speech and PT/OT

## 2020-12-29 NOTE — PROGRESS NOTES
PRIMARY DIAGNOSIS: SYNCOPE/TIA  OUTPATIENT/OBSERVATION GOALS TO BE MET BEFORE DISCHARGE:  1. Orthostatic performed: N/A    2. Diagnostic testing complete & at baseline neurologic testing: No    3. Cleared by consultants (if involved): No    4. Interpretation of cardiac rhythm per telemetry tech: SR/ST    5. Tolerating adequate PO diet and medications: NPO     6. Return to near baseline physical activity or neurologic status: No    Discharge Planner Nurse   Safe discharge environment identified: No  Barriers to discharge: Yes       Entered by: Lexi Moreau 12/29/2020 6:42 AM     Please review provider order for any additional goals.   Nurse to notify provider when observation goals have been met and patient is ready for discharge.

## 2020-12-29 NOTE — PLAN OF CARE
PT: Eval orders received chart reviewed. Pt currently OBS status. Pt with low blood sugar levels, not appropriate for PT evaluation. Will hold PT this day to allow for further medical management to improve pt's ability to fully participate in evaluation.

## 2020-12-29 NOTE — PROGRESS NOTES
12/29/20 [facility]   General Information   Onset of Illness/Injury or Date of Surgery 12/28/20   Referring Physician Dr. Reilly   Patient/Family Therapy Goal Statement (SLP) Patient did not state.    Pertinent History of Current Problem Emily Lockett is a 89-year-old  female with history of moderate dementia, hypertension, hyperlipidemia, history of PE, who was initially admitted to [facility] Emergency Department for possible stroke.  According to the EMS report, the patient had a mechanical fall earlier in the morning and she had an abrasion of her forehead.  After the fall, she went back to sleep and woke up and was doing therapy around 10:00 when the therapist noted that she was very weak and unsteady on her feet.  They thought this was due to chronic weakness.  However, about an hour and a half later, she started having some difficulty with her speech and some slurred words as well as right facial droop.  EMS was contacted and the patient was transported to [facility].  Blood sugars were 114.    General Observations Pleasant confused.    Past History of Dysphagia No documented history found in her chart.    Type of Evaluation   Type of Evaluation Swallow Evaluation   Oral Motor   Oral Musculature unable to assess due to poor participation/comprehension   Dentition (Oral Motor)   Dentition (Oral Motor) dental appliance/dentures   Dental Appliance/Denture (Oral Motor) upper and lower   Vocal Quality/Secretion Management (Oral Motor)   Vocal Quality (Oral Motor) WFL   General Swallowing Observations   Current Diet/Method of Nutritional Intake (General Swallowing Observations, NIS) NPO   Respiratory Support (General Swallowing Observations) none   Swallowing Evaluation Clinical swallow evaluation   Clinical Swallow Evaluation   Feeding Assistance dependent   Clinical Swallow Evaluation Textures Trialed Nectar-Thick Liquids;Puree Textures   Clinical Swallow Evaluation: [place]-Thick  Liquid Texture Trial   Mode of Presentation, Nectar cup;spoon;fed by clinician;self-fed   Volume of Nectar Presented 4 oz of juice   Oral Phase, Nectar Poor AP movement;Premature pharyngeal entry   Pharyngeal Phase, Nectar impaired;reduction in laryngeal movement;repeated swallows   Diagnostic Statement Hold holding and delayed swallows. No overt Sx of aspiration.    Clinical Swallow Evaluation: Puree Solid Texture Trial   Mode of Presentation, Puree spoon;fed by clinician   Volume of Puree Presented 2 oz of apple sauce   Oral Phase, Puree Poor AP movement;Residue in oral cavity;Premature pharyngeal entry   Oral Residue, Puree mid posterior tongue   Pharyngeal Phase, Puree impaired;reduction in laryngeal movement;repeated swallows   Diagnostic Statement Bolus holding and reduced AP movement and delay.    Esophageal Phase of Swallow   Patient reports or presents with symptoms of esophageal dysphagia Yes   Esophageal comments History of presabyesophagus 1996   Swallowing Recommendations   Diet Consistency Recommendations dysphagia level 1 (pureed);nectar-thick liquids   Supervision Level for Intake 1:1 supervision needed   Mode of Delivery Recommendations bolus size, small;no straws;food moistened;slow rate of intake   Postural Recommendations none   Swallowing Maneuver Recommendations alternate food and liquid intake   Monitoring/Assistance Required (Eating/Swallowing) monitor for cough or change in vocal quality with intake;stop eating activities when fatigue is present;check mouth frequently for oral residue/pocketing   Recommended Feeding/Eating Techniques (Swallow Eval) maintain upright sitting position for eating;maintain upright posture during/after eating for 30 minutes;provide assist with feeding;set-up and prepare tray;moisten oral mucosa prior to intake   Medication Administration Recommendations, Swallowing (SLP) Crush medications   General Therapy Interventions   Planned Therapy Interventions Dysphagia  Treatment   Dysphagia treatment Modified diet education;Instruction of safe swallow strategies   SLP Therapy Assessment/Plan   Criteria for Skilled Therapeutic Interventions Met (SLP Eval) yes   SLP Diagnosis Moderate dysphagia   Rehab Potential (SLP Eval) fair, will monitor progress closely   Therapy Frequency (SLP Eval) 5 times/wk   Predicted Duration of Therapy Intervention (SLP Eval) 1 week   Comment, Therapy Assessment/Plan (SLP) Patient presents with moderate oral and pharyngeal dysphagia at bedside secondary to confusion. Deficits include bolus holding, delayed AP movement of the bolus, cues needed to swallow at times, delayed swallow response. Patient with bolus holding of nectar thick liquids via the cup and delayed swallow response. No immdeiate or delayed Sx of aspiration. Bolus holding of pureed texture, mastication of it to assist in AP movement of the bolus and delay. No vocal changes after the swallow. Patient continues to be at risk for aspiration given confusion and low blood sugars.    Therapy Plan Review/Discharge Plan (SLP)   Therapy Plan Review (SLP) evaluation/treatment results reviewed;risks/benefits reviewed;current/potential barriers reviewed;participants included;patient   SLP Discharge Planning    SLP Discharge Recommendation (DC Rec) Transitional Care Facility   SLP Rationale for DC Rec Patient will need on going ST needs for dysphagia management.    SLP Brief overview of current status  Patient with moderate dysphagia. Recommend: DDL 1 with nectar thick liquids. 2. Upright, 1;1 supervison and assistance with feeding. Upright, remain upright 30 minutes after meals, slow rate, cues to swallow, and alternate liquids/solids. Hold diet if overt Sx of aspiration present.     Total Evaluation Time   Total Evaluation Time (Minutes) 20

## 2020-12-29 NOTE — PROGRESS NOTES
COVID neg. A/O to self. Confused. Baseline dementia. Redirectable. NPO due to failed dysphagia screening. Neuro checks Q 4 hrs. Neuros intact. Slight facial droopiness. BG checks Q 6hrs. BG 53 at 2130. D 50 given IV PRN @ 2200. BG recheck 133. Tele SR. VSS on RA. PIV NS 75ml/hr. Up with 1-2 to BS. CT and MRI completed. PT/OT/SL consulted. Neurology following.

## 2020-12-29 NOTE — UTILIZATION REVIEW
Admission Status; Secondary Review Determination    Under the authority of the Utilization Management Committee, the utilization review process indicated a secondary review on the above patient. The review outcome is based on review of the medical records, discussions with staff, and applying clinical experience noted on the date of the review.    (x) Inpatient Status Appropriate - This patient's medical care is consistent with medical management for inpatient care and reasonable inpatient medical practice.    RATIONALE FOR DETERMINATION:89-year-old female with history of dementia, hypertension and pulmonary embolus presented to the hospital after a fall with new lower extremity weakness and episodes of aphasia.  Blood sugar in route was 114.  Patient does not have a history of diabetes nor is on any blood sugar lowering medications.  Patient has significant cerebrovascular disease without an acute stroke.  While in the hospital patient developed significant hypoglycemia requiring intravenous dextrose.  The following morning patient had recurrent hypoglycemia while on intravenous dextrose.  Due to the severity of hypoglycemia refractive to multiple recurrent boluses and continuous infusion of IV dextrose.  Not safe for discharge will require greater than 2 nights in the hospital for ongoing evaluation and management.    At the time of admission with the information available to the attending physician more than 2 nights Hospital complex care was anticipated, based on patient risk of adverse outcome if treated as outpatient and complex care required. Inpatient admission is appropriate based on the Medicare guidelines.    This document was produced using voice recognition software    The information on this document is developed by the utilization review team in order for the business office to ensure compliance. This only denotes the appropriateness of proper admission status and does not reflect the quality of  care rendered.    The definitions of Inpatient Status and Observation Status used in making the determination above are those provided in the CMS Coverage Manual, Chapter 1 and Chapter 6, section 70.4.    Sincerely,    Angel Esteban MD  Utilization Review  Physician Advisor  [facility].

## 2020-12-29 NOTE — PLAN OF CARE
COVID Neg   Pt is Alert to self, VSS on RA, DD1 w/ Necator thick diet, Assist of 1 w/ GB and walker, Manage persistent hypoglycemia w/ iv push 25 ml x2, IV [id number] 100 ml/hr, BLE Edema +1-+2, Bruises on the face, arms and legs at baseline, continue to monitor

## 2020-12-29 NOTE — PROVIDER NOTIFICATION
MD Notification    Notified Person: MD    Notified Person Name: Gayatri    Notification Date/Time: 12/28/2020 2133    Notification Interaction:web page    Purpose of Notification: Blood sugar is 53, NPO status. please advise. thanks    Orders Received: D 50 given    Comments:

## 2020-12-30 ENCOUNTER — APPOINTMENT (OUTPATIENT)
Dept: PHYSICAL THERAPY | Facility: CLINIC | Age: 85
DRG: 056 | End: 2020-12-30
Attending: INTERNAL MEDICINE
Payer: MEDICARE

## 2020-12-30 ENCOUNTER — APPOINTMENT (OUTPATIENT)
Dept: SPEECH THERAPY | Facility: CLINIC | Age: 85
DRG: 056 | End: 2020-12-30
Payer: MEDICARE

## 2020-12-30 LAB
ANION GAP SERPL CALCULATED.3IONS-SCNC: 10 MMOL/L (ref 3–14)
BUN SERPL-MCNC: 21 MG/DL (ref 7–30)
CALCIUM SERPL-MCNC: 8.2 MG/DL (ref 8.5–10.1)
CHLORIDE SERPL-SCNC: 114 MMOL/L (ref 94–109)
CO2 SERPL-SCNC: 16 MMOL/L (ref 20–32)
CREAT SERPL-MCNC: 1.24 MG/DL (ref 0.52–1.04)
CRP SERPL-MCNC: 56.3 MG/L (ref 0–8)
ERYTHROCYTE [DISTWIDTH] IN BLOOD BY AUTOMATED COUNT: 14.8 % (ref 10–15)
GFR SERPL CREATININE-BSD FRML MDRD: 36 ML/MIN/{1.73_M2}
GLUCOSE BLDC GLUCOMTR-MCNC: 126 MG/DL (ref 70–99)
GLUCOSE BLDC GLUCOMTR-MCNC: 134 MG/DL (ref 70–99)
GLUCOSE BLDC GLUCOMTR-MCNC: 64 MG/DL (ref 70–99)
GLUCOSE BLDC GLUCOMTR-MCNC: 68 MG/DL (ref 70–99)
GLUCOSE BLDC GLUCOMTR-MCNC: 74 MG/DL (ref 70–99)
GLUCOSE BLDC GLUCOMTR-MCNC: 86 MG/DL (ref 70–99)
GLUCOSE BLDC GLUCOMTR-MCNC: 91 MG/DL (ref 70–99)
GLUCOSE SERPL-MCNC: 120 MG/DL (ref 70–99)
HCT VFR BLD AUTO: 24.3 % (ref 35–47)
HGB BLD-MCNC: 7.8 G/DL (ref 11.7–15.7)
MCH RBC QN AUTO: 32 PG (ref 26.5–33)
MCHC RBC AUTO-ENTMCNC: 32.1 G/DL (ref 31.5–36.5)
MCV RBC AUTO: 100 FL (ref 78–100)
NT-PROBNP SERPL-MCNC: ABNORMAL PG/ML (ref 0–1800)
PLATELET # BLD AUTO: 90 10E9/L (ref 150–450)
POTASSIUM SERPL-SCNC: 3.9 MMOL/L (ref 3.4–5.3)
PROCALCITONIN SERPL-MCNC: <0.05 NG/ML
RBC # BLD AUTO: 2.44 10E12/L (ref 3.8–5.2)
SODIUM SERPL-SCNC: 140 MMOL/L (ref 133–144)
WBC # BLD AUTO: 6 10E9/L (ref 4–11)

## 2020-12-30 PROCEDURE — 120N000001 HC R&B MED SURG/OB

## 2020-12-30 PROCEDURE — 999N001017 HC STATISTIC GLUCOSE BY METER IP

## 2020-12-30 PROCEDURE — 99233 SBSQ HOSP IP/OBS HIGH 50: CPT | Performed by: INTERNAL MEDICINE

## 2020-12-30 PROCEDURE — 36415 COLL VENOUS BLD VENIPUNCTURE: CPT | Performed by: INTERNAL MEDICINE

## 2020-12-30 PROCEDURE — 80048 BASIC METABOLIC PNL TOTAL CA: CPT | Performed by: INTERNAL MEDICINE

## 2020-12-30 PROCEDURE — 250N000013 HC RX MED GY IP 250 OP 250 PS 637: Performed by: INTERNAL MEDICINE

## 2020-12-30 PROCEDURE — 258N000001 HC RX 258: Performed by: INTERNAL MEDICINE

## 2020-12-30 PROCEDURE — 999N000157 HC STATISTIC RCP TIME EA 10 MIN

## 2020-12-30 PROCEDURE — 94640 AIRWAY INHALATION TREATMENT: CPT

## 2020-12-30 PROCEDURE — 97161 PT EVAL LOW COMPLEX 20 MIN: CPT | Mod: GP

## 2020-12-30 PROCEDURE — 85027 COMPLETE CBC AUTOMATED: CPT | Performed by: INTERNAL MEDICINE

## 2020-12-30 PROCEDURE — 250N000009 HC RX 250: Performed by: INTERNAL MEDICINE

## 2020-12-30 PROCEDURE — 250N000009 HC RX 250: Performed by: PHYSICIAN ASSISTANT

## 2020-12-30 PROCEDURE — 86140 C-REACTIVE PROTEIN: CPT | Performed by: INTERNAL MEDICINE

## 2020-12-30 PROCEDURE — 83880 ASSAY OF NATRIURETIC PEPTIDE: CPT | Performed by: INTERNAL MEDICINE

## 2020-12-30 PROCEDURE — 94640 AIRWAY INHALATION TREATMENT: CPT | Mod: 76

## 2020-12-30 PROCEDURE — 84145 PROCALCITONIN (PCT): CPT | Performed by: INTERNAL MEDICINE

## 2020-12-30 PROCEDURE — 97530 THERAPEUTIC ACTIVITIES: CPT | Mod: GP

## 2020-12-30 PROCEDURE — 97116 GAIT TRAINING THERAPY: CPT | Mod: GP

## 2020-12-30 PROCEDURE — 92526 ORAL FUNCTION THERAPY: CPT | Mod: GN | Performed by: SPEECH-LANGUAGE PATHOLOGIST

## 2020-12-30 PROCEDURE — 250N000011 HC RX IP 250 OP 636: Performed by: INTERNAL MEDICINE

## 2020-12-30 PROCEDURE — 258N000001 HC RX 258: Performed by: PHYSICIAN ASSISTANT

## 2020-12-30 RX ORDER — DEXTROSE MONOHYDRATE 100 MG/ML
INJECTION, SOLUTION INTRAVENOUS CONTINUOUS
Status: DISCONTINUED | OUTPATIENT
Start: 2020-12-30 | End: 2021-01-01

## 2020-12-30 RX ORDER — LEVOTHYROXINE SODIUM 88 UG/1
88 TABLET ORAL
Status: DISCONTINUED | OUTPATIENT
Start: 2020-12-31 | End: 2021-01-01 | Stop reason: HOSPADM

## 2020-12-30 RX ORDER — FUROSEMIDE 10 MG/ML
40 INJECTION INTRAMUSCULAR; INTRAVENOUS ONCE
Status: COMPLETED | OUTPATIENT
Start: 2020-12-30 | End: 2020-12-30

## 2020-12-30 RX ORDER — IPRATROPIUM BROMIDE AND ALBUTEROL SULFATE 2.5; .5 MG/3ML; MG/3ML
3 SOLUTION RESPIRATORY (INHALATION)
Status: DISCONTINUED | OUTPATIENT
Start: 2020-12-30 | End: 2021-01-01

## 2020-12-30 RX ADMIN — Medication 1 MG: at 21:38

## 2020-12-30 RX ADMIN — IPRATROPIUM BROMIDE AND ALBUTEROL SULFATE 3 ML: .5; 3 SOLUTION RESPIRATORY (INHALATION) at 19:29

## 2020-12-30 RX ADMIN — DORZOLAMIDE HCL 1 DROP: 20 SOLUTION/ DROPS OPHTHALMIC at 08:56

## 2020-12-30 RX ADMIN — ASPIRIN 81 MG CHEWABLE TABLET 81 MG: 81 TABLET CHEWABLE at 08:22

## 2020-12-30 RX ADMIN — DEXTROSE MONOHYDRATE: 100 INJECTION, SOLUTION INTRAVENOUS at 16:00

## 2020-12-30 RX ADMIN — TIMOLOL MALEATE 1 DROP: 5 SOLUTION/ DROPS OPHTHALMIC at 08:56

## 2020-12-30 RX ADMIN — OMEPRAZOLE 20 MG: 20 CAPSULE, DELAYED RELEASE ORAL at 08:22

## 2020-12-30 RX ADMIN — BIMATOPROST 1 DROP: 0.1 SOLUTION/ DROPS OPHTHALMIC at 21:39

## 2020-12-30 RX ADMIN — TAMSULOSIN HYDROCHLORIDE 0.4 MG: 0.4 CAPSULE ORAL at 18:00

## 2020-12-30 RX ADMIN — IPRATROPIUM BROMIDE AND ALBUTEROL SULFATE 3 ML: .5; 3 SOLUTION RESPIRATORY (INHALATION) at 10:52

## 2020-12-30 RX ADMIN — QUETIAPINE 12.5 MG: 25 TABLET, FILM COATED ORAL at 21:38

## 2020-12-30 RX ADMIN — FUROSEMIDE 40 MG: 10 INJECTION, SOLUTION INTRAVENOUS at 08:22

## 2020-12-30 RX ADMIN — DORZOLAMIDE HCL 1 DROP: 20 SOLUTION/ DROPS OPHTHALMIC at 20:20

## 2020-12-30 RX ADMIN — AMLODIPINE BESYLATE 2.5 MG: 2.5 TABLET ORAL at 08:21

## 2020-12-30 RX ADMIN — TIMOLOL MALEATE 1 DROP: 5 SOLUTION/ DROPS OPHTHALMIC at 20:20

## 2020-12-30 RX ADMIN — SODIUM CHLORIDE: 234 INJECTION INTRAMUSCULAR; INTRAVENOUS; SUBCUTANEOUS at 04:08

## 2020-12-30 RX ADMIN — IPRATROPIUM BROMIDE AND ALBUTEROL SULFATE 3 ML: .5; 3 SOLUTION RESPIRATORY (INHALATION) at 14:55

## 2020-12-30 RX ADMIN — FERROUS SULFATE TAB 325 MG (65 MG ELEMENTAL FE) 325 MG: 325 (65 FE) TAB at 08:22

## 2020-12-30 ASSESSMENT — ACTIVITIES OF DAILY LIVING (ADL)
FALL_HISTORY_WITHIN_LAST_SIX_MONTHS: YES
ADLS_ACUITY_SCORE: 23
EATING/SWALLOWING: SWALLOWING SOLID FOOD
DIFFICULTY_COMMUNICATING: YES
CONCENTRATING,_REMEMBERING_OR_MAKING_DECISIONS_DIFFICULTY: YES
WALKING_OR_CLIMBING_STAIRS: AMBULATION DIFFICULTY, DEPENDENT
ADLS_ACUITY_SCORE: 21
DOING_ERRANDS_INDEPENDENTLY_DIFFICULTY: OTHER (SEE COMMENTS)
COMMUNICATION: DIFFICULTY SPEAKING
WALKING_OR_CLIMBING_STAIRS_DIFFICULTY: YES
ADLS_ACUITY_SCORE: 21
TOILETING_ASSISTANCE: TOILETING DIFFICULTY, DEPENDENT
TOILETING_ISSUES: YES
DIFFICULTY_EATING/SWALLOWING: YES
ADLS_ACUITY_SCORE: 23
EQUIPMENT_CURRENTLY_USED_AT_HOME: WALKER, ROLLING
DRESSING/BATHING: BATHING DIFFICULTY, DEPENDENT
ADLS_ACUITY_SCORE: 24
WEAR_GLASSES_OR_BLIND: NO
ADLS_ACUITY_SCORE: 27
DRESSING/BATHING_DIFFICULTY: YES

## 2020-12-30 NOTE — PLAN OF CARE
SLP: Attempted to see patient for swallow treatment, but was sound a sleep. Per her nurse tolerated breakfast well.

## 2020-12-30 NOTE — PROGRESS NOTES
Pt admitted with aphasia, slurred speech and possible R facial droop, CT and MRI neg finding for stroke, most likely caused from hypoglycemia, anemia and sever dementia. Covid neg. A&O to self. VSS, on RA. LS diminished. Unable to  follow more completed commands, Neuro's exhibit, slow to respond, aphasia/WFD, able to move all extremities, equal strength 4/5 in BUE/BLE. BG 84 at 1800. IV D10 NS infusing 100mL/hr.  DD1 necatar thick liquid diet, total feed, good appetite. Urinary retention, bladder scanned 458 at 1430, straight cath 600cc at 1615, DTV. Incont of stool. Up A1 wl GB and walker. BLE/BUE bruising. Plan to transfer to station 66.

## 2020-12-30 NOTE — PROVIDER NOTIFICATION
MD Notification    Notified Person: MD    Notified Person Name: ALEXIS Gaytan    Notification Date/Time: 12/29/2020 @ 19:39    Notification Interaction:    Purpose of Notification: pt is wheezing, fine crackles in bárbara lung lobes. HTN, and tachycardic. Fluids stopped, any other interventions?    Orders Received:    Comments:      Paged again @ 20:31    PURPOSE: pt has transfer orders to 66. Updated JANY Hernandez . Stopped fluids. Please advice.

## 2020-12-30 NOTE — PROGRESS NOTES
Pt. Transferred to 66 from obs around 2100 this evening. A & O x 1 (self only); calm and cooperative. Denies pain at this time. Fluids adjusted to new rate of 50 mL/hr. Will continue to monitor blood sugar. Chest x-ray will be completed this evening. Tele monitoring. Nursing will continue to monitor and assess.      Mary Barrett RN on 12/29/2020 at 9:25 PM

## 2020-12-30 NOTE — PLAN OF CARE
Cognitive Concerns/ Orientation : A & O x 1 (self only); alert and calm, word-finding difficult  BEHAVIOR & AGGRESSION TOOL COLOR: Green   CIWA SCORE: N/A   ABNL VS/O2: VSS on RA  MOBILITY: total cares; turn and repo q2h; strong assist of 2 if out of bed; fall risk  PAIN MANAGMENT: Appears comfortable  DIET: DD1 with nectar thick liquids   BOWEL/BLADDER: incontinent of B&B; need to be bladder scanned, 300 ml. Had multiple BMs.  ABNL LAB/BG: BG 68, 91; creatinine= 1.14; Iron=26    DRAIN/DEVICES: PIV infusing D10 with 0.45% Sodium chloride infusion (CMPD) at 50 mL/hr   TELEMETRY RHYTHM: SR  SKIN: scattered bruising and scabs on face, forehead, and UE bilaterally; BLE edema +1-2 (elevated extremities)  TESTS/PROCEDURES:   D/C DAY/GOALS/PLACE: Discharge pending 1-2 days  OTHER IMPORTANT INFO: Lungs diminished with occasional expiratory wheezing. Dyspnea on exertion. Continues to have a frequent congested cough; neuro checks q4h (neuro checks limited assessment due to patient unable to follow commands at all times). Neurology, PT, OT, Speech following  MD/RN ROUNDING SIGNED OFF D/E SHIFT: N/A  COMMIT TO SIT DONE AND SIGNED OFF: complete

## 2020-12-30 NOTE — PLAN OF CARE
DATE & TIME: 12/29/2020; [id number]  Cognitive Concerns/ Orientation : A & O x 1 (self only); alert and calm, forgetful; word-finding difficult  BEHAVIOR & AGGRESSION TOOL COLOR: Green   CIWA SCORE: N/A   ABNL VS/O2: VSS on RA except tachycardic; continuous pulse ox, O2 95-96%   MOBILITY: total cares; turn and repo q2h; strong assist of 2 if out of bed; fall risk  PAIN MANAGMENT: denies; resting comfortable   DIET: DD1 with nectar thick liquids   BOWEL/BLADDER: BS active x 4; incontinent of B&B; purewick in place, urinary retention noted, need to be bladder scanned, 352 (MD notified, no straight cath at this time)  ABNL LAB/BG: BG=70; creatinine= 1.14; Iron=26    DRAIN/DEVICES: PIV infusing D10 with 0.45% Sodium chloride infusion (CMPD) at 50 mL/hr (rate decreased this evening)   TELEMETRY RHYTHM: ST  SKIN: scattered bruising and scabs on face, forehead, and UE bilaterally; BLE edema +1-2 (elevated extremities)  TESTS/PROCEDURES: chest x-ray completed   D/C DAY/GOALS/PLACE: pending discharge  OTHER IMPORTANT INFO: Lungs diminished with expiratory wheezing present; dyspnea on exertion; congested frequent cough; vitals q4h; neuros q4h (neuro checks inconsistent, some intact,  unable to follow commands); monitor BG frequently; takes pills well with pudding; neurology, PT, OT, speech, swallow following; daughter updated on plan of care; Nursing will continue to monitor and assess    MD/RN ROUNDING SIGNED OFF D/E SHIFT: N/A  COMMIT TO SIT DONE AND SIGNED OFF: complete  IS THE PATIENT ON REMDESIVIR? DATE OF LAST SCHEDULED DOSE: yes

## 2020-12-30 NOTE — PROGRESS NOTES
[facility]    HOSPITALIST PROGRESS NOTE :   --------------------------------------------------    Date of Admission:  12/28/2020    Cumulative Summary: Emily Lockett is a 89 year old female with complicated past medical history significant for moderate dementia, essential hypertension, hyperlipidemia, history of PE who presented to ER due to the concern for possible stroke associated with slurred speech and right..  Patient underwent a very thorough stroke work-up which was negative and was admitted for further evaluation and management.    Assessment & Plan     Transient speech difficulties: Most likely secondary to combination of hypoglycemia , anemia along with underlying severe dementia  Patient presented to ED due to the concern for slurred speech and possible right facial droop.  Patient was evaluated by stroke neurology and code stroke was activated.  Patient underwent head CT, CT scan of head with perfusion and MRI of the brain.  Had a stroke work-up came back negative and neurology felt her symptoms were more likely related to dementia.  After admission, patient was also noticed to have persistent hypoglycemia requiring D5 infusion.  She was also noticed to have improvement in her speech after her blood sugars were improved.  She does not have any other focal neurological deficits this morning and seems to be answering appropriately also her comprehension is significantly impaired secondary to underlying dementia.  She was not able to provide any meaningful information to me.  This morning, patient again dropped her blood sugars to 68.  Frequent falls:     --Continue to monitor patient closely.  --Continue patient on dysphagia level 1 diet which is a started after speech therapy evaluation, nursing has been assisting her with feeding this morning.  --We will go ahead and will increase her D10 0.45 saline to 75 mL/h, later during the afternoon reevaluation, will change her fluids to  only D10 at 50 mL/h she has been L with good oral intake.  --Strict intake and output.  --Daily weights.  --We will give patient 1 dose of Lasix 40 mg IV x1 due to the concern for fluid overload.  --Physical and Occupational Therapy evaluation is ordered currently she is a resident of long-term care facility.  --As above, patient has undergone detailed stroke work-up which came back negative, she had recent echocardiogram done earlier this month on December 3 which showed ejection fraction of 40 to 45%, patient does not need any repeat echocardiogram at this point.  --We will continue to monitor patient closely and will evaluate if patient is able to maintain her blood sugars once she is off the IV fluids.    Mild acute on chronic systolic CHF exacerbation: She was recently hospitalized earlier this month at [facility] where echocardiogram was done showing ejection fraction of 40 to 45% with mild mitral regurgitation.  Patient does not have any significant edema but seems to have some bronchospasm this morning, she has been receiving IV fluids for keeping her blood sugars up and for hydration.  --Patient does not need repeated echocardiogram, echo results were reviewed as above.  --We will give patient 1 dose of Lasix 40 mg IV x1, will reassess again tomorrow morning if dose needs to be repeated.  --Strict intake and output.  --Daily weight.  --Procalcitonin, white blood cell count was also checked which came back negative, her chest x-ray finding is most likely consistent with CHF exacerbation rather than pneumonia.will not order any antibiotics at this point   -- her bronchospasm seems to be more cardiac in origin, will order duo neb QID for 48 hours and reassess    Urinary retention:   --Continue to monitor patient closely with as needed bladder scan and intermittent catheterization.  Start patient on Flomax.  --Will avoid Mora catheter if possible due to patient underlying dementia but will place if patient is  requiring multiple intermittent caths.    Anemia: Was found to have hemoglobin of 7.4 which was down from her previous hemoglobin of 8.4.seems to have iron deficiency but also likely has some component of anemia of chronic disease with low iron binding capacity    --Hemoglobin was repeated this morning which is up to 7.8, in agreement with the plan for not pursuing with more invasive gastroenterology work-up per family wishes considering her advanced age and dementia.  --Started patient on ferrous sulfate 325 mg p.o. daily for a month.    Essential hypertension  --Continue patient on PTA amlodipine  --Continue patient on PTA aspirin.    Alzheimer's dementia: Patient seems to have significant Janes advanced dementia and was not even able to tell me her name or birthday.  Of note, significant advanced dementia can also result in speech difficulties and poor oral intake which might explain her presentation.  --Continue Seroquel 12.5 mg at bedtime.    Hypothyroidism  -- start patient on PTA synthroid 88 mcg daily     History of glaucoma:   -- Continue PTA Lumigan, dorzolamide and timolol eyedrops.    GERD  --Continue PTA omeprazole.    Stage 3 chronic kidney disease, unspecified whether stage 3a or 3b CKD (12/28/2020)  -- stable, continue to monitor     Diet: Dysphagia Diet Level 1 Pureed Nectar Thickened Liquids (pre-thickened or use instant food thickener)  Room Service    Mora Catheter: not present  DVT Prophylaxis: Pneumatic Compression Devices  Code Status: No CPR- Do NOT Intubate    The patient's care was discussed with the Bedside Nurse and Patient.Detailed conversation with son Haroldo was also done regarding her current medical status, they are in agreement with the plan for ongoing current management      Disposition Plan   Expected discharge: 1-2 days , will probably return to her long term care facility    More than 40 min of the time was spend in care today, more than 50% of the time was spent in patient care  coordination and counseling.      Kesha Velasquez MD, FACP  Text Page (7am - 6pm)    ----------------------------------------------------------------------------------------------------------------------    Interval History    Patient was seen and examined, sitting in bed, bedside nursing also present at the bedside, patient again dropped blood sugar this morning and does not seem to be responding very well to verbal commands initially but later started to get perked up as her blood sugars improved.  She has been able to consume breakfast interaction with the help of the eat.  She is otherwise denying any chest pain, palpitations or shortness of breath she seems to have more bronchospasm this morning.    -Data reviewed today: I reviewed all new labs and imaging results over the last 24 hours.    I personally reviewed CT scan of the head without contrast, perfusion CT and MRI of the brain which was negative for any ischemic work-up.    Physical Exam   Temp: 97.5  F (36.4  C) Temp src: Axillary BP: 123/69 Pulse: 96   Resp: 20 SpO2: 93 % O2 Device: None (Room air)    Vitals:    12/28/20 1334 12/29/20 2101   Weight: 45.4 kg (100 lb) 47.4 kg (104 lb 8 oz)     Vital Signs with Ranges  Temp:  [94.1  F (34.5  C)-97.6  F (36.4  C)] 97.5  F (36.4  C)  Pulse:  [] 96  Resp:  [14-20] 20  BP: (110-174)/(63-95) 123/69  SpO2:  [92 %-97 %] 93 %  I/O last 3 completed shifts:  In: 1389.17 [P.O.:230; I.V.:1159.17]  Out: 750 [Urine:750]    GENERAL: Alert and awake , not oriented to time, place or person but very pleasant and cooperative ,Conversational, appropriate.   HEENT: Normocephalic. EOMI. No icterus or injection. Nares normal.   LUNGS: Clear to auscultation except end exp wheezing in the bases , no crackles ,No dyspnea at rest.   HEART: Regular rate. Extremities perfused.   ABDOMEN: Soft, nontender, and nondistended. Positive bowel sounds.   EXTREMITIES: No LE edema noted.   NEUROLOGIC: Moves extremities x4 on command. No  acute focal neurologic abnormalities noted.     Medications     dextrose 10% and 0.45% NaCl 75 mL/hr at 12/30/20 0849     - MEDICATION INSTRUCTIONS -         amLODIPine  2.5 mg Oral Daily     aspirin  81 mg Oral Daily     bimatoprost  1 drop Both Eyes At Bedtime     dorzolamide  1 drop Both Eyes BID     ferrous sulfate  325 mg Oral Daily     ipratropium - albuterol 0.5 mg/2.5 mg/3 mL  3 mL Nebulization 4x daily     omeprazole  20 mg Oral Daily     QUEtiapine  12.5 mg Oral At Bedtime     sodium chloride (PF)  3 mL Intracatheter Q8H     tamsulosin  0.4 mg Oral Daily with supper     timolol maleate  1 drop Both Eyes BID       Data   Recent Labs   Lab 12/29/20  0716 12/29/20  0715 12/28/20  1408   WBC  --  5.7 5.6   HGB  --  7.7* 7.4*   MCV  --  101* 104*   PLT  --  92* 88*   INR  --   --  1.08     --  139   POTASSIUM 4.0  --  4.0   CHLORIDE 117*  --  113*   CO2 19*  --  20   BUN 27  --  29   CR 1.14*  --  1.22*   ANIONGAP 6  --  6   IMAN 8.4*  --  8.2*   *  --  93   ALBUMIN  --   --  2.1*   PROTTOTAL  --   --  4.9*   BILITOTAL  --   --  <0.1*   ALKPHOS  --   --  78   ALT  --   --  17   AST  --   --  10       Imaging:   Recent Results (from the past 24 hour(s))   XR Chest Port 1 View    Narrative    EXAM: XR CHEST PORT 1 VW  LOCATION: [facility]  DATE/TIME: 12/29/2020 10:21 PM    INDICATION: Crackles.  COMPARISON: None.    FINDINGS: Upright portable chest. The heart size is normal. Thoracic aorta is calcified. There is prominent interstitial disease in the periphery of the lungs in lung bases. There is a left basilar infiltrate. No pneumothorax. Degenerative disease in the   spine.      Impression    IMPRESSION:   1. Interstitial pulmonary edema.  2. Left basilar atelectasis or pneumonia.

## 2020-12-30 NOTE — PROGRESS NOTES
12/30/20 0900   Quick Adds   Type of Visit Initial PT Evaluation   Living Environment   People in home facility resident   Current Living Arrangements extended care facility   Home Accessibility no concerns   Transportation Anticipated family or friend will provide   Living Environment Comments Patient unable to provide any subjective history but per RN who spoke to family, patient is from a [facility]1 senior care home where she has 24/7 assist available.    Self-Care   Usual Activity Tolerance good   Current Activity Tolerance fair   Equipment Currently Used at Home walker, rolling   Activity/Exercise/Self-Care Comment Per RN's conversation with family, patient is typically able to ambulate around her facility with her walker.    Disability/Function   Hearing Difficulty or Deaf no   Wear Glasses or Blind no   Concentrating, Remembering or Making Decisions Difficulty yes   Difficulty Communicating other (see comments)  (minimal verbal communication noted)   Difficulty Eating/Swallowing yes   Walking or Climbing Stairs Difficulty yes   Walking or Climbing Stairs ambulation difficulty, requires equipment   Mobility Management FWW   Fall history within last six months yes   Number of times patient has fallen within last six months   (unable to state but admitted with a fall)   Change in Functional Status Since Onset of Current Illness/Injury yes   General Information   Onset of Illness/Injury or Date of Surgery 12/28/20   Referring Physician Marquis Reilly MD   Patient/Family Therapy Goals Statement (PT) patient did not state, but per RN - family wants her to return to her facility   Pertinent History of Current Problem (include personal factors and/or comorbidities that impact the POC) Patient is 88 YO F admitted with weakness and slurred speech. She also had a mechanical fall just prior to admission. MRI negative for acute infarct. She has been having episodes of hypoglycemia his admission. PMH: dementia, HTN,  PE, multiple falls, CKD, TBI   Existing Precautions/Restrictions fall   General Observations Patient sitting up in bed with JANY Camacho at bedside, RN states patient's glucose is improved and approved patient for PT session.    Cognition   Orientation Status (Cognition) oriented to;person   Affect/Mental Status (Cognition) confused   Follows Commands (Cognition) follows one-step commands;25-49% accuracy;repetition of directions required;physical/tactile prompts required;initiation impaired;increased processing time needed;delayed response/completion;verbal cues/prompting required   Safety Deficit (Cognition) at risk behavior observed;impulsivity   Memory Deficit (Cognition) other (see comments)  (patient unable to provide any subjective history)   Cognitive Status Comments Patient initially with little to no verbal interaction but intermittently following simple commands. By end of session, patient verbally answering approximately 25% of questions. Delayed initiation following commands.    Pain Assessment   Patient Currently in Pain No   Integumentary/Edema   Integumentary/Edema Comments Multiple areas of bruising on face and extremities.    Posture    Posture Protracted shoulders;Forward head position   Range of Motion (ROM)   ROM Quick Adds ROM WFL   Strength   Manual Muscle Testing Quick Adds Deficits observed during functional mobility;Able to perform R SLR;Able to perform L SLR   Strength Comments Genearlized weakness noted during activity, patient with difficulty following commands for formal MMT   Bed Mobility   Bed Mobility supine-sit   Supine-Sit [place] (Bed Mobility) supervision;verbal cues   Comment (Bed Mobility) Patient impulsively initiated supine to sit, increased time and verbal cues with use of bed railings needed to reposition to edge of bed.    Transfers   Transfers sit-stand transfer   Transfer Safety Concerns Noted decreased balance during turns;losing balance backward;decreased sequencing  ability   Impairments Contributing to Impaired Transfers impaired balance;decreased strength   Sit-Stand Transfer   Sit-Stand [place] (Transfers) 2 person assist;moderate assist (50% patient effort);verbal cues   Assistive Device (Sit-Stand Transfers) walker, front-wheeled   Sit/Stand Transfer Comments Sit to stand with mod assist +2, manual assist for hand placements; Stand to sit with mod assist for eccentric control   Gait/Stairs (Locomotion)   [place] Level (Gait) moderate assist (50% patient effort);2 person assist;verbal cues   Assistive Device (Gait) walker, front-wheeled   Deviations/Abnormal Patterns (Gait) base of support, narrow;stride length decreased;gait speed decreased;festinating/shuffling   Comment (Gait/Stairs) Patient ambulated 5' away from bed with FWW, gait belt and mod assist +2 for balance, very narrow base of support noted  with significant difficulty initiating taking steps. Assist for stabilizing walker and for balance.    Balance   Balance Comments Min assist for trunk support during seated exercises; Mod assist for standing balance at FWW   Sensory Examination   Sensory Perception patient reports no sensory changes   Coordination   Coordination Comments Difficulty initiating movement observed   Clinical Impression   Criteria for Skilled Therapeutic Intervention yes, treatment indicated   PT Diagnosis (PT) impaired mobility   Influenced by the following impairments weakness, decreased balance, impaired cognition   Functional limitations due to impairments increased difficulty with bed mobility, transfers and ambulation   Clinical Presentation Evolving/Changing   Clinical Presentation Rationale cognition decline from baseline, fluctuating blood sugars, PMH   Clinical Decision Making (Complexity) low complexity   Therapy Frequency (PT) Daily   Predicted Duration of Therapy Intervention (days/wks) 1 week   Planned Therapy Interventions (PT) balance training;bed mobility  training;gait training;home exercise program;neuromuscular re-education;patient/family education;strengthening;stair training;transfer training   Risk & Benefits of therapy have been explained evaluation/treatment results reviewed;care plan/treatment goals reviewed;risks/benefits reviewed;current/potential barriers reviewed;participants voiced agreement with care plan;participants included;patient   Clinical Impression Comments Due to patient's impaired cognition, extensive cueing needed  to perform funtional mobility at this time.    PT Discharge Planning    PT Discharge Recommendation (DC Rec) home with assist;home with home care physical therapy   PT Rationale for DC Rec Per discussion with RN (who spoke to patient's family), patient lives in a care facility where she will have 24/7 assist. Initially patient required +2 assist for mobility but progressed within session to +1 assist (second person for equipment management only). If her current facility is able to provide 24/7 assist, anticipate she will be able to return there with HHPT. If facility is unable to provide increased assist level, TCU will be needed.    PT Brief overview of current status  By end of session, sit <> stand with mod assist +1, gait with FWW and mod assist +1   Total Evaluation Time   Total Evaluation Time (Minutes) 7

## 2020-12-30 NOTE — PROGRESS NOTES
MD Notification    Notified Person: MD    Notified Person Name: Dipak Sheehan     Notification Date/Time: 12/29/2020; 2233     Notification Interaction: web-based paging     Purpose of Notification: Hello, pt. bladder scan 352. pt. was straight cath x 1 earlier today. Just need clarification on straight cath orders. Thank you!     Orders Received: No straight cath this evening. straight cath if over >400 mL. Flomax ordered. Continue to monitor.     Comments:

## 2020-12-30 NOTE — PROGRESS NOTES
Called regarding wheezing, abdominal muscle use. Nursing has turned off fluids due to concern for fluid overload.  Recent Echo with EF 40-45%. No I&Os. O2 9% on room air.    - Check CXR  - Reduce fluids to 50 ml/hr  - Continue glucose monitoring      Donna Gaytan PA-C  Hospitalist Service  [phone]

## 2020-12-31 ENCOUNTER — APPOINTMENT (OUTPATIENT)
Dept: OCCUPATIONAL THERAPY | Facility: CLINIC | Age: 85
DRG: 056 | End: 2020-12-31
Payer: MEDICARE

## 2020-12-31 ENCOUNTER — APPOINTMENT (OUTPATIENT)
Dept: PHYSICAL THERAPY | Facility: CLINIC | Age: 85
DRG: 056 | End: 2020-12-31
Payer: MEDICARE

## 2020-12-31 LAB
ALBUMIN SERPL-MCNC: 2.3 G/DL (ref 3.4–5)
ALP SERPL-CCNC: 95 U/L (ref 40–150)
ALT SERPL W P-5'-P-CCNC: 21 U/L (ref 0–50)
ANION GAP SERPL CALCULATED.3IONS-SCNC: 5 MMOL/L (ref 3–14)
AST SERPL W P-5'-P-CCNC: 25 U/L (ref 0–45)
BILIRUB SERPL-MCNC: 0.2 MG/DL (ref 0.2–1.3)
BUN SERPL-MCNC: 22 MG/DL (ref 7–30)
CALCIUM SERPL-MCNC: 7.9 MG/DL (ref 8.5–10.1)
CHLORIDE SERPL-SCNC: 110 MMOL/L (ref 94–109)
CO2 SERPL-SCNC: 20 MMOL/L (ref 20–32)
CREAT SERPL-MCNC: 1.35 MG/DL (ref 0.52–1.04)
CRP SERPL-MCNC: 23.9 MG/L (ref 0–8)
GFR SERPL CREATININE-BSD FRML MDRD: 33 ML/MIN/{1.73_M2}
GLUCOSE BLDC GLUCOMTR-MCNC: 110 MG/DL (ref 70–99)
GLUCOSE BLDC GLUCOMTR-MCNC: 72 MG/DL (ref 70–99)
GLUCOSE BLDC GLUCOMTR-MCNC: 75 MG/DL (ref 70–99)
GLUCOSE BLDC GLUCOMTR-MCNC: 85 MG/DL (ref 70–99)
GLUCOSE BLDC GLUCOMTR-MCNC: 86 MG/DL (ref 70–99)
GLUCOSE BLDC GLUCOMTR-MCNC: 94 MG/DL (ref 70–99)
GLUCOSE SERPL-MCNC: 89 MG/DL (ref 70–99)
POTASSIUM SERPL-SCNC: 3.7 MMOL/L (ref 3.4–5.3)
PROT SERPL-MCNC: 5.2 G/DL (ref 6.8–8.8)
SODIUM SERPL-SCNC: 135 MMOL/L (ref 133–144)

## 2020-12-31 PROCEDURE — 999N001017 HC STATISTIC GLUCOSE BY METER IP

## 2020-12-31 PROCEDURE — 94640 AIRWAY INHALATION TREATMENT: CPT | Mod: 76

## 2020-12-31 PROCEDURE — 250N000013 HC RX MED GY IP 250 OP 250 PS 637: Performed by: INTERNAL MEDICINE

## 2020-12-31 PROCEDURE — 97165 OT EVAL LOW COMPLEX 30 MIN: CPT | Mod: GO | Performed by: OCCUPATIONAL THERAPIST

## 2020-12-31 PROCEDURE — 99233 SBSQ HOSP IP/OBS HIGH 50: CPT | Performed by: INTERNAL MEDICINE

## 2020-12-31 PROCEDURE — 120N000001 HC R&B MED SURG/OB

## 2020-12-31 PROCEDURE — 97535 SELF CARE MNGMENT TRAINING: CPT | Mod: GO | Performed by: OCCUPATIONAL THERAPIST

## 2020-12-31 PROCEDURE — 250N000011 HC RX IP 250 OP 636: Performed by: INTERNAL MEDICINE

## 2020-12-31 PROCEDURE — 999N000157 HC STATISTIC RCP TIME EA 10 MIN

## 2020-12-31 PROCEDURE — 97116 GAIT TRAINING THERAPY: CPT | Mod: GP

## 2020-12-31 PROCEDURE — 80053 COMPREHEN METABOLIC PANEL: CPT | Performed by: INTERNAL MEDICINE

## 2020-12-31 PROCEDURE — 86140 C-REACTIVE PROTEIN: CPT | Performed by: INTERNAL MEDICINE

## 2020-12-31 PROCEDURE — 36415 COLL VENOUS BLD VENIPUNCTURE: CPT | Performed by: INTERNAL MEDICINE

## 2020-12-31 PROCEDURE — 250N000009 HC RX 250: Performed by: INTERNAL MEDICINE

## 2020-12-31 PROCEDURE — 94640 AIRWAY INHALATION TREATMENT: CPT

## 2020-12-31 PROCEDURE — 97530 THERAPEUTIC ACTIVITIES: CPT | Mod: GP

## 2020-12-31 RX ORDER — FUROSEMIDE 10 MG/ML
10 INJECTION INTRAMUSCULAR; INTRAVENOUS ONCE
Status: COMPLETED | OUTPATIENT
Start: 2020-12-31 | End: 2020-12-31

## 2020-12-31 RX ADMIN — OMEPRAZOLE 20 MG: 20 CAPSULE, DELAYED RELEASE ORAL at 08:29

## 2020-12-31 RX ADMIN — TIMOLOL MALEATE 1 DROP: 5 SOLUTION/ DROPS OPHTHALMIC at 21:29

## 2020-12-31 RX ADMIN — IPRATROPIUM BROMIDE AND ALBUTEROL SULFATE 3 ML: .5; 3 SOLUTION RESPIRATORY (INHALATION) at 15:13

## 2020-12-31 RX ADMIN — ASPIRIN 81 MG CHEWABLE TABLET 81 MG: 81 TABLET CHEWABLE at 08:29

## 2020-12-31 RX ADMIN — DORZOLAMIDE HCL 1 DROP: 20 SOLUTION/ DROPS OPHTHALMIC at 08:45

## 2020-12-31 RX ADMIN — IPRATROPIUM BROMIDE AND ALBUTEROL SULFATE 3 ML: .5; 3 SOLUTION RESPIRATORY (INHALATION) at 07:18

## 2020-12-31 RX ADMIN — DORZOLAMIDE HCL 1 DROP: 20 SOLUTION/ DROPS OPHTHALMIC at 21:29

## 2020-12-31 RX ADMIN — AMLODIPINE BESYLATE 2.5 MG: 2.5 TABLET ORAL at 08:29

## 2020-12-31 RX ADMIN — TAMSULOSIN HYDROCHLORIDE 0.4 MG: 0.4 CAPSULE ORAL at 17:26

## 2020-12-31 RX ADMIN — LEVOTHYROXINE SODIUM 88 MCG: 88 TABLET ORAL at 06:08

## 2020-12-31 RX ADMIN — TIMOLOL MALEATE 1 DROP: 5 SOLUTION/ DROPS OPHTHALMIC at 08:45

## 2020-12-31 RX ADMIN — IPRATROPIUM BROMIDE AND ALBUTEROL SULFATE 3 ML: .5; 3 SOLUTION RESPIRATORY (INHALATION) at 13:26

## 2020-12-31 RX ADMIN — IPRATROPIUM BROMIDE AND ALBUTEROL SULFATE 3 ML: .5; 3 SOLUTION RESPIRATORY (INHALATION) at 19:00

## 2020-12-31 RX ADMIN — FUROSEMIDE 10 MG: 10 INJECTION, SOLUTION INTRAVENOUS at 10:46

## 2020-12-31 RX ADMIN — QUETIAPINE 12.5 MG: 25 TABLET, FILM COATED ORAL at 22:55

## 2020-12-31 RX ADMIN — FERROUS SULFATE TAB 325 MG (65 MG ELEMENTAL FE) 325 MG: 325 (65 FE) TAB at 08:29

## 2020-12-31 RX ADMIN — BIMATOPROST 1 DROP: 0.1 SOLUTION/ DROPS OPHTHALMIC at 22:55

## 2020-12-31 ASSESSMENT — ACTIVITIES OF DAILY LIVING (ADL)
ADLS_ACUITY_SCORE: 24
ADLS_ACUITY_SCORE: 25
ADLS_ACUITY_SCORE: 24
ADLS_ACUITY_SCORE: 24

## 2020-12-31 NOTE — PROGRESS NOTES
[facility]    HOSPITALIST PROGRESS NOTE :   --------------------------------------------------    Date of Admission:  12/28/2020    Cumulative Summary: Emily Lockett is a 89 year old female with complicated past medical history significant for moderate dementia, essential hypertension, hyperlipidemia, history of PE who presented to ER due to the concern for possible stroke associated with slurred speech and right..  Patient underwent a very thorough stroke work-up which was negative and was admitted for further evaluation and management.    Assessment & Plan     Transient speech difficulties: Most likely secondary to combination of hypoglycemia , anemia along with underlying severe dementia  Patient presented to ED due to the concern for slurred speech and possible right facial droop.  Patient was evaluated by stroke neurology and code stroke was activated.  Patient underwent head CT, CT scan of head with perfusion and MRI of the brain.  Had a stroke work-up came back negative and neurology felt her symptoms were more likely related to dementia.  After admission, patient was also noticed to have persistent hypoglycemia requiring D5 infusion.  She was also noticed to have improvement in her speech after her blood sugars were improved.  She does not have any other focal neurological deficits this morning and seems to be answering appropriately also her comprehension is significantly impaired secondary to underlying dementia.  She was not able to provide any meaningful information to me.  This morning, patient again dropped her blood sugars to 68.  Frequent falls:     --Continue to monitor patient closely.  --Continue patient on dysphagia level 1 diet which is a started after speech therapy evaluation, nursing has been assisting her with feeding this morning.  -- will hold D10 and will evaluate if she is able to maintain her blood sugars  -- will change her accu checks to QAC and HS  -- IF  patient continues to require D5 then might need Accu checks and hypoglycemia protocol on discharge like glucose gel available , she also does well with orange juice and apple sauce  --Strict intake and output.  --Daily weights.  -- will give one more dose of 10 mg IV lasix this morning after reviewing her electrolytes , discussed plan of care in detail with son also who told me that in the past patient was on low dose diuretic on daily basis.  --Physical and Occupational Therapy evaluation is ordered currently she is a resident of long-term care facility.  --As above, patient has undergone detailed stroke work-up which came back negative, she had recent echocardiogram done earlier this month on December 3 which showed ejection fraction of 40 to 45%, patient does not need any repeat echocardiogram at this point.    Mild acute on chronic systolic CHF exacerbation: She was recently hospitalized earlier this month at [facility] where echocardiogram was done showing ejection fraction of 40 to 45% with mild mitral regurgitation.  Patient does not have any significant edema but seems to have some bronchospasm this morning, she has been receiving IV fluids for keeping her blood sugars up and for hydration.    --Patient does not need repeated echocardiogram, echo results were reviewed as above.  --We will give patient 1 dose of Lasix 10 mg IV x1, will reassess again tomorrow morning if dose needs to be repeated.  -- check BMP tomorrow  -- will evaluate if patient needs to be discharged on low dose diuretic daily or every other day   --Strict intake and output.  --Daily weight.  --Procalcitonin, white blood cell count was also checked which came back negative, her chest x-ray finding is most likely consistent with CHF exacerbation rather than pneumonia.will not order any antibiotics at this point   -- Her bronchospasm seems to be more cardiac in origin, will order duo neb QID for 48 hours and reassess    Urinary retention:   --  Continue to monitor patient closely with as needed bladder scan and intermittent catheterization.  -- Start patient on Flomax.  -- Will avoid Mora catheter if possible due to patient underlying dementia but will place if patient is requiring multiple intermittent caths.    Anemia: Was found to have hemoglobin of 7.4 which was down from her previous hemoglobin of 8.4.seems to have iron deficiency but also likely has some component of anemia of chronic disease with low iron binding capacity  --Hemoglobin was repeated yesterday morning which is up to 7.8, in agreement with the plan for not pursuing with more invasive gastroenterology work-up per family wishes considering her advanced age and dementia.  --Started patient on ferrous sulfate 325 mg p.o. daily for a month.    Essential hypertension  --Continue patient on PTA amlodipine  --Continue patient on PTA aspirin.    Alzheimer's dementia: Patient seems to have significant Janes advanced dementia and was not even able to tell me her name or birthday.  Of note, significant advanced dementia can also result in speech difficulties and poor oral intake which might explain her presentation.  --Continue Seroquel 12.5 mg at bedtime.    Hypothyroidism  -- start patient on PTA synthroid 88 mcg daily     History of glaucoma:   -- Continue PTA Lumigan, dorzolamide and timolol eyedrops.    GERD  --Continue PTA omeprazole.    Stage 3 chronic kidney disease, unspecified whether stage 3a or 3b CKD (12/28/2020)  -- stable, continue to monitor     Diet: Dysphagia Diet Level 1 Pureed Nectar Thickened Liquids (pre-thickened or use instant food thickener)  Room Service    Mora Catheter: not present  DVT Prophylaxis: Pneumatic Compression Devices  Code Status: No CPR- Do NOT Intubate    The patient's care was discussed with the Bedside Nurse and Patient.Detailed conversation with son Haroldo was also done regarding her current medical status, they are in agreement with the plan for ongoing  current management      Disposition Plan   Expected discharge: 1-2 days , will probably return to her long term care facility    More than 35 min of the time was spend in care today, more than 50% of the time was spent in patient care coordination and counseling.      Kesha Velasquez MD, FACP  Text Page (7am - 6pm)    ----------------------------------------------------------------------------------------------------------------------    Interval History    Was seen and examined, sitting in bed, bedside nursing also present.  She was eating breakfast she did have 1 blood glucose of 72 this morning, we discussed about the stopping D10 infusion and starting patient on regular Accu-Cheks.She is otherwise denying any complaints.    -Data reviewed today: I reviewed all new labs and imaging results over the last 24 hours.    I personally reviewed CT scan of the head without contrast, perfusion CT and MRI of the brain which was negative for any ischemic work-up.    Physical Exam   Temp: 97.4  F (36.3  C) Temp src: Oral BP: 121/67 Pulse: 98   Resp: 18 SpO2: 98 % O2 Device: None (Room air)    Vitals:    12/29/20 2101 12/30/20 1500 12/31/20 0650   Weight: 47.4 kg (104 lb 8 oz) 45.4 kg (100 lb 1.4 oz) 45.5 kg (100 lb 4.8 oz)     Vital Signs with Ranges  Temp:  [97.4  F (36.3  C)-98.1  F (36.7  C)] 97.4  F (36.3  C)  Pulse:  [] 98  Resp:  [16-20] 18  BP: ()/(51-71) 121/67  SpO2:  [96 %-98 %] 98 %  I/O last 3 completed shifts:  In: 1722 [P.O.:420; I.V.:1302]  Out: 800 [Urine:800]    GENERAL: Alert and awake , not oriented to time, place or person but very pleasant and cooperative ,Conversational, appropriate.   HEENT: Normocephalic. EOMI. No icterus or injection. Nares normal.   LUNGS: Clear to auscultation except end exp wheezing in the bases , no crackles ,No dyspnea at rest.   HEART: Regular rate. Extremities perfused.   ABDOMEN: Soft, nontender, and nondistended. Positive bowel sounds.   EXTREMITIES: No LE edema  noted.   NEUROLOGIC: Moves extremities x4 on command. No acute focal neurologic abnormalities noted.     Medications     [Held by provider] dextrose 10% Stopped (12/31/20 0844)     - MEDICATION INSTRUCTIONS -         amLODIPine  2.5 mg Oral Daily     aspirin  81 mg Oral Daily     bimatoprost  1 drop Both Eyes At Bedtime     dorzolamide  1 drop Both Eyes BID     ferrous sulfate  325 mg Oral Daily     ipratropium - albuterol 0.5 mg/2.5 mg/3 mL  3 mL Nebulization 4x daily     levothyroxine  88 mcg Oral QAM AC     omeprazole  20 mg Oral Daily     QUEtiapine  12.5 mg Oral At Bedtime     sodium chloride (PF)  3 mL Intracatheter Q8H     tamsulosin  0.4 mg Oral Daily with supper     timolol maleate  1 drop Both Eyes BID       Data   Recent Labs   Lab 12/30/20  1004 12/29/20  0716 12/29/20  0715 12/28/20  1408   WBC 6.0  --  5.7 5.6   HGB 7.8*  --  7.7* 7.4*     --  101* 104*   PLT 90*  --  92* 88*   INR  --   --   --  1.08    142  --  139   POTASSIUM 3.9 4.0  --  4.0   CHLORIDE 114* 117*  --  113*   CO2 16* 19*  --  20   BUN 21 27  --  29   CR 1.24* 1.14*  --  1.22*   ANIONGAP 10 6  --  6   IMAN 8.2* 8.4*  --  8.2*   * 138*  --  93   ALBUMIN  --   --   --  2.1*   PROTTOTAL  --   --   --  4.9*   BILITOTAL  --   --   --  <0.1*   ALKPHOS  --   --   --  78   ALT  --   --   --  17   AST  --   --   --  10       Imaging:   No results found for this or any previous visit (from the past 24 hour(s)).

## 2020-12-31 NOTE — PLAN OF CARE
DATE & TIME: 12/30/2020; 3047-0941  Cognitive Concerns/ Orientation : A & O x 1 (self only); alert and calm, mainly speaking with one word answers  BEHAVIOR & AGGRESSION TOOL COLOR: Green   CIWA SCORE: N/A   ABNL VS/O2: VSS on RA  MOBILITY: total cares; turn and repo q2h; strong assist of 2 if out of bed; fall risk  PAIN MANAGMENT: Appears comfortable  DIET: DD1 with nectar thick liquids , tolerated well  BOWEL/BLADDER: incontinent of B&B; urinary retention noted, need to be bladder scanned, 274. Voided 600ml from 7p-7a from [facility].   ABNL LAB/BG: BG 75,72. 1 cup applesauce given; creatinine= 1.21; Iron=26    DRAIN/DEVICES: PIV infusing D10 at 50 mL/hr   TELEMETRY RHYTHM: SR  SKIN: scattered bruising and scabs on face, forehead, and UE bilaterally; BLE edema +1-2 (elevated extremities)  TESTS/PROCEDURES:   D/C DAY/GOALS/PLACE: Discharge pending 1-2 days  OTHER IMPORTANT INFO: Lungs diminished with occasional expiratory wheezing. Dyspnea on exertion. neuro checks q4h (neuro checks limited assessment due to patient unable to follow commands at all times). Neurology, PT, OT, speech, swallow following  MD/RN ROUNDING SIGNED OFF D/E SHIFT: N/A  COMMIT TO SIT DONE AND SIGNED OFF: complete

## 2020-12-31 NOTE — PLAN OF CARE
DATE & TIME: 2020-1930    Cognitive Concerns/ Orientation : Alert to self only, knew her name, but not her .   BEHAVIOR & AGGRESSION TOOL COLOR: green   CIWA SCORE: na    ABNL VS/O2: vss, on RA, diminished on LLL.   MOBILITY: Ax1 with GB/WK.   PAIN MANAGMENT: appeared to be comfortable.   DIET: NDD1 with nectar.   BOWEL/BLADDER: inc of B&B, purewich in place, strict I&O   ABNL LAB/BG: Cr. 1.35, CRP 23.9. /85. D10 infusion stopped this am.   DRAIN/DEVICES: PIV on right arm, SL.   TELEMETRY RHYTHM: na   SKIN: bruises, pale.   TESTS/PROCEDURES: na   D/C DAY/GOALS/PLACE: pending  OTHER IMPORTANT INFO: son, , and residential staff updated over the phone.   MD/RN ROUNDING SIGNED OFF D/E SHIFT: yes   COMMIT TO SIT DONE AND SIGNED OFF yes   IS THE PATIENT ON REMDESIVIR? DATE OF LAST SCHEDULED DOSE: na

## 2020-12-31 NOTE — PROGRESS NOTES
12/31/20 1409   Quick Adds   Type of Visit Initial Occupational Therapy Evaluation   Living Environment   People in home facility resident   Current Living Arrangements assisted living   Home Accessibility no concerns   Transportation Anticipated health plan transportation   Living Environment Comments pt answering only occassionally and in one word phrases.  Not able to provide history   Self-Care   Usual Activity Tolerance good   Current Activity Tolerance fair   Equipment Currently Used at Home walker, rolling   Activity/Exercise/Self-Care Comment per chart notes pt is able to ambulate around facility with walker   Disability/Function   Hearing Difficulty or Deaf no   Wear Glasses or Blind no   Concentrating, Remembering or Making Decisions Difficulty yes   Concentration Management hx of dementia   Difficulty Communicating yes   Communication difficulty speaking   Difficulty Eating/Swallowing yes   Eating/Swallowing swallowing solid food   Walking or Climbing Stairs Difficulty yes   Walking or Climbing Stairs ambulation difficulty, dependent   Mobility Management FWW   Dressing/Bathing Difficulty yes   Dressing/Bathing bathing difficulty, dependent   Toileting issues yes   Toileting Assistance toileting difficulty, dependent   Doing Errands Independently Difficulty (such as shopping) other (see comments)   Fall history within last six months yes   General Information   Onset of Illness/Injury or Date of Surgery 12/28/20   Referring Physician Marquis Reilly   Patient/Family Therapy Goal Statement (OT) not stated,  Chart notes indicate family would like pt to return to [facility] if possible   Additional Occupational Profile Info/Pertinent History of Current Problem Pt admitted with weankess, slurred speech, mechanical fall.  MRI negatvie for infarct.  PMH includes dementia, HTN, PE, multiple falls, CKD, TBI, episodes of hypoglycemia while here   Performance Patterns (Routines, Roles, Habits) pt gettng assist for ADLS  from facility staff   Existing Precautions/Restrictions fall   General Observations and Info pt sitting up in chair with arms corssed, alarms on   Cognitive Status Examination   Orientation Status person   Affect/Mental Status (Cognitive) confused;flat/blunted affect   Follows Commands follows one-step commands;25-49% accuracy   Safety Deficit moderate deficit   Memory Deficit severe deficit   Pain Assessment   Patient Currently in Pain No   Range of Motion Comprehensive   General Range of Motion bilateral upper extremity ROM WFL   Strength Comprehensive (MMT)   General Manual Muscle Testing (MMT) Assessment other (see comments)   Comment, General Manual Muscle Testing (MMT) Assessment pt appears to have functional strength, not able to measure formally   Coordination   Upper Extremity Coordination No deficits were identified   Transfers   Transfers sit-stand transfer   Sit-Stand Transfer   Sit-Stand [place] (Transfers) verbal cues;moderate assist (50% patient effort)   Assistive Device (Sit-Stand Transfers) walker, front-wheeled   Clinical Impression   Criteria for Skilled Therapeutic Interventions Met (OT) yes   OT Diagnosis decreased independence in ADLS.   OT Problem List-Impairments impacting ADL problems related to;activity tolerance impaired;cognition;inability to direct their own care;strength   Assessment of Occupational Performance 3-5 Performance Deficits   Identified Performance Deficits decreased independence and ability to assist caregivers with dressing, bathing, functional mobility including bed mobility    Planned Therapy Interventions (OT) ADL retraining;cognition;strengthening   Therapy Frequency (OT) 5x/week   Predicted Duration of Therapy 7 days   Risks and Benefits of Treatment have been explained. Yes   Patient, Family & other staff in agreement with plan of care Yes   OT Discharge Planning    OT Discharge Recommendation (DC Rec) Home with assist;home with home care occupational therapy    OT Rationale for DC Rec Pt slow to respond but did better as session went on.  Appears able to help caregivers with basic cares if items presented to her with direcions and time is given for her to respond.  Could beneifit from skilled OT here and at [facility] to increase ability to assist staff at [facility]   OT Brief overview of current status  Mod to Max A with ADLs.  Presentation of items and single step commands appear to help   Total Evaluation Time (Minutes)   Total Evaluation Time (Minutes) 15

## 2020-12-31 NOTE — PLAN OF CARE
E: 12/30/2020; 07-19  Cognitive Concerns/ Orientation : A & O x 1 (self only); alert and calm, mainly speaking with one word answers  BEHAVIOR & AGGRESSION TOOL COLOR: Green   CIWA SCORE: N/A   ABNL VS/O2: VSS on RA  MOBILITY: total cares; turn and repo q2h; strong assist of 2 if out of bed; fall risk  PAIN MANAGMENT: Appears comfortable  DIET: DD1 with nectar thick liquids , tolerated well  BOWEL/BLADDER: incontinent of B&B; urinary retention noted, need to be bladder scanned, 274   ABNL LAB/BG: BG 84; creatinine= 1.21; Iron=26 ,elevated BNP  DRAIN/DEVICES: PIV infusing  at 50 mL/hr   TELEMETRY RHYTHM: SR  SKIN: scattered bruising and scabs on face, forehead, and UE bilaterally; BLE edema +1-2 (elevated extremities)  TESTS/PROCEDURES:   D/C DAY/GOALS/PLACE: Discharge pending 1-2 days  OTHER IMPORTANT INFO: Lungs diminished with occasional expiratory wheezing. Dyspnea on exertion. Continues to have a frequent congested cough; neuro checks q4h (neuro checks limited assessment due to patient unable to follow commands at all times). Neurology, PT, OT, speech, swallow following  MD/RN ROUNDING SIGNED OFF D/E SHIFT: N/A  COMMIT TO SIT DONE AND SIGNED OFF: complete

## 2021-01-01 VITALS
DIASTOLIC BLOOD PRESSURE: 47 MMHG | HEART RATE: 86 BPM | WEIGHT: 100.3 LBS | TEMPERATURE: 98.4 F | BODY MASS INDEX: 18.35 KG/M2 | RESPIRATION RATE: 18 BRPM | OXYGEN SATURATION: 95 % | SYSTOLIC BLOOD PRESSURE: 123 MMHG

## 2021-01-01 LAB
GLUCOSE BLDC GLUCOMTR-MCNC: 119 MG/DL (ref 70–99)
GLUCOSE BLDC GLUCOMTR-MCNC: 69 MG/DL (ref 70–99)
GLUCOSE BLDC GLUCOMTR-MCNC: 71 MG/DL (ref 70–99)
GLUCOSE BLDC GLUCOMTR-MCNC: 88 MG/DL (ref 70–99)

## 2021-01-01 PROCEDURE — 999N001017 HC STATISTIC GLUCOSE BY METER IP

## 2021-01-01 PROCEDURE — 250N000013 HC RX MED GY IP 250 OP 250 PS 637: Performed by: INTERNAL MEDICINE

## 2021-01-01 PROCEDURE — 99239 HOSP IP/OBS DSCHRG MGMT >30: CPT | Performed by: INTERNAL MEDICINE

## 2021-01-01 RX ORDER — TAMSULOSIN HYDROCHLORIDE 0.4 MG/1
0.4 CAPSULE ORAL
Qty: 30 CAPSULE | Refills: 0 | Status: SHIPPED | OUTPATIENT
Start: 2021-01-01

## 2021-01-01 RX ORDER — POTASSIUM CHLORIDE 750 MG/1
10 TABLET, EXTENDED RELEASE ORAL
Qty: 30 TABLET | Refills: 0 | Status: SHIPPED | OUTPATIENT
Start: 2021-01-01

## 2021-01-01 RX ORDER — FUROSEMIDE 20 MG
10 TABLET ORAL
Qty: 30 TABLET | Refills: 0 | Status: SHIPPED | OUTPATIENT
Start: 2021-01-01

## 2021-01-01 RX ORDER — FERROUS SULFATE 325(65) MG
325 TABLET ORAL
Qty: 30 TABLET | Refills: 0 | Status: SHIPPED | OUTPATIENT
Start: 2021-01-01

## 2021-01-01 RX ORDER — GLUCOSAMINE HCL/CHONDROITIN SU 500-400 MG
CAPSULE ORAL
Qty: 100 EACH | Refills: 0 | Status: SHIPPED | OUTPATIENT
Start: 2021-01-01

## 2021-01-01 RX ADMIN — TIMOLOL MALEATE 1 DROP: 5 SOLUTION/ DROPS OPHTHALMIC at 08:44

## 2021-01-01 RX ADMIN — DORZOLAMIDE HCL 1 DROP: 20 SOLUTION/ DROPS OPHTHALMIC at 08:44

## 2021-01-01 RX ADMIN — LEVOTHYROXINE SODIUM 88 MCG: 88 TABLET ORAL at 06:57

## 2021-01-01 RX ADMIN — AMLODIPINE BESYLATE 2.5 MG: 2.5 TABLET ORAL at 08:41

## 2021-01-01 RX ADMIN — FERROUS SULFATE TAB 325 MG (65 MG ELEMENTAL FE) 325 MG: 325 (65 FE) TAB at 08:41

## 2021-01-01 RX ADMIN — ASPIRIN 81 MG CHEWABLE TABLET 81 MG: 81 TABLET CHEWABLE at 08:41

## 2021-01-01 RX ADMIN — OMEPRAZOLE 20 MG: 20 CAPSULE, DELAYED RELEASE ORAL at 08:41

## 2021-01-01 ASSESSMENT — ACTIVITIES OF DAILY LIVING (ADL)
ADLS_ACUITY_SCORE: 25

## 2021-01-01 NOTE — DISCHARGE SUMMARY
[facility]  Hospitalist Discharge Summary      Date of Admission:  12/28/2020  Date of Discharge:  1/1/2021  Discharging Provider: Kesha Velasquez MD, FACP    Discharge Diagnoses   Transient speech difficulties, most likely secondary to hypoglycemia and underlying severe dementia.  Frequent falls.  Generalized weakness.  Mild acute on chronic systolic CHF exacerbation.  Urinary retention, resolved.  Iron deficiency anemia along with anemia of chronic disease   Essential hypertension  Alzheimer's dementia.  Hypothyroidism.  History of glaucoma.  GERD.  Stage III chronic kidney disease.    Follow-ups Needed After Discharge   Follow-up Appointments     Follow-up and recommended labs and tests      Follow up with primary care provider, Maksim Castano, within 7 days to   evaluate medication change, for hospital follow- up and to follow up on   results.  The following labs/tests are recommended: BMP and Hgb.             Unresulted Labs Ordered in the Past 30 Days of this Admission     No orders found from 11/28/2020 to 12/29/2020.          Discharge Disposition   Discharged to assisted living  Condition at discharge: Stable    Hospital Course     Cumulative Summary: Emily Lockett is a 89 year old female with complicated past medical history significant for moderate dementia, essential hypertension, hyperlipidemia, history of PE who presented to ER due to the concern for possible stroke associated with slurred speech and right..  Patient underwent a very thorough stroke work-up which was negative and was admitted for further evaluation and management.  Here are further details regarding her current hospitalization:       Transient speech difficulties: improved ,Most likely secondary to combination of hypoglycemia with underlying severe dementia    Patient presented to ED due to the concern for slurred speech and possible right facial droop.  Patient was evaluated by stroke neurology and code stroke  was activated.  Patient underwent head CT, CT scan of head with perfusion and MRI of the brain. Her stroke work-up came back negative and neurology felt her symptoms were more likely related to dementia.  After admission, patient was also noticed to have persistent hypoglycemia requiring D5 and then later D10 infusion   She was noticed to have improvement in her speech after her blood sugars were improved.  She does not have any other focal neurological deficits and over all her speech and mentation seems to be improved , although her comprehension is significantly impaired secondary to underlying dementia. She is still not able to any meaningful information to me.  She was taken off D10 infusion yesterday, she was able to maintain her blood sugars for the last 24 hours with the supplement of food and was given juice this morning for blood sugar of 69.    Frequent falls: Most likely secondary to combination of generalized debilitation, anemia, probable hypoglycemia and dementia.    --Patient will be discharged to her assisted living today along with home health care, RN, physical and Occupational Therapy.  --She will be continued on dysphagia level 1 diet which was a started after speech therapy evaluation, she has required persistent assist with her food intake during the hospitalization and will probably require the assist after the hospitalization also.  I have discussed this with son also.  -- She will be discharged with regular Accu-Cheks which should be done 4 times a day at least in the beginning.  If patient blood sugars are below 70 will recommend giving her juice or applesauce.  Glucose gel tablets are also ordered on discharge.  --I did discuss with her family that most likely hypoglycemia is secondary to poor glycogen storage in the liver from poor oral intake and malnourishment.  --She will also benefit from addition of boost or Ensure 2-3 times a day if she can tolerate.  --Patient is maintaining her  blood sugars with oral intake, she might not need Accu-Cheks 4 times a day and frequency can be decreased as needed.  --Family was hoping if patient will make further improvement with the help of therapies, at this time physical and Occupational Therapy is ordered.  --As above, patient has undergone detailed stroke work-up during this hospitalization which was negative.  --Her recent echocardiogram done earlier this month in outside facility was also reviewed which showed ejection fraction of 40 to 45%.  --During the hospitalization, possibility of future hospice involvement was also discussed if patient does not make significant improvement in the coming weeks to month or continues to require recurrent hospitalizations.  --I have discussed with family that hospice care can be involved anytime with the help of primary care physician even in an outpatient setting.     Mild acute on chronic systolic CHF exacerbation: She was recently hospitalized earlier this month at [facility] where echocardiogram was done showing ejection fraction of 40 to 45% with mild mitral regurgitation.  Patient was not noticed to have any significant edema but initially did require IV fluids for her hypoglycemia and dehydration.  She did have some bronchospasm initially for which she received DuoNeb.  Her infection work-up was negative and she did not have any signs and symptoms of pneumonia.  She did receive a dose of IV Lasix 40 mg 1 day and after that she also received 10 mg of Lasix yesterday.  In the past she has been on and off low-dose Lasix 10 mg or 20 mg/day but was not on any diuretic when she presented to the hospital.  Her BNP was elevated more than 10 K.    --At this time patient will be discharged on Lasix 10 mg p.o. daily along with potassium chloride 10 mEq every 48 hours.  --Will recommend daily weights.  --If needed then Lasix can be changed to daily basis, but currently ordering cautiously due to patient poor oral intake.  --  Once again patient has recent echocardiogram done in the beginning of December at [facility], results were reviewed as above and echocardiogram was not done during this hospitalization.     Urinary retention:   --Was noticed to have urinary retention, she was a started on Flomax, she did not require administration of Mora catheter and has a good response to Flomax.  -- I will be discharging patient on Flomax for 30 days, if possible then Flomax can be stopped after a month and patient can be monitored to make sure she does not to start to retain urine again.     Anemia: patient was found to have hemoglobin of 7.4 which was down from her previous hemoglobin of 8.4.seems to have iron deficiency but also likely has some component of anemia of chronic disease with low iron binding capacity  At this time, I agree with the plan for not pursuing more invasive gastroenterology work-up per family wishes considering her advanced age and dementia.  Also she might not qualify for any aggressive therapies like surgery or chemotherapy after the result of colonoscopy.    --Her anemia might be playing a role in her generalized weakness, I started patient on ferrous sulfate 325 mg p.o. daily for a month.  --Her hemoglobin can be repeated when she follows up with her PCP.     Essential hypertension  --Continue patient on PTA amlodipine  --Continue patient on PTA aspirin.     Alzheimer's dementia: Patient seems to have significant Janes advanced dementia and was not even able to tell me her name or birthday.  Of note, significant advanced dementia can also result in speech difficulties and poor oral intake which might explain her presentation.  --Continue Seroquel 12.5 mg at bedtime.     Hypothyroidism  -- start patient on PTA synthroid 88 mcg daily      History of glaucoma:   -- Continue PTA Lumigan, dorzolamide and timolol eyedrops.     GERD  --Continue PTA omeprazole.     Stage 3 chronic kidney disease, unspecified whether stage 3a or  3b CKD (12/28/2020)  -- stable, continue to monitor      Patient was seen and examined on the day of discharge ,she is feeling well, does not have any complaints , I did review the discharge medications and instructions in detail with patient, bedside nursing and her son . I have discussed with family multiple times that at this time her risk of readmission remains high due to her chronic comorbidities.  Family showed understanding. She is given follow up with the PCP after the hospitalization .patient was in agreement , she is discharged in stable condition to her assisted living.  Home health care, home RN, home physical and Occupational Therapy is also ordered on discharge.     Consultations This Hospital Stay   NEUROLOGY IP CONSULT  PHYSICAL THERAPY ADULT IP CONSULT  OCCUPATIONAL THERAPY ADULT IP CONSULT  SPEECH LANGUAGE PATH ADULT IP CONSULT  SWALLOW EVAL SPEECH PATH AT BEDSIDE IP CONSULT  SMOKING CESSATION PROGRAM IP CONSULT  CARE MANAGEMENT / SOCIAL WORK IP CONSULT    Code Status   No CPR- Do NOT Intubate    Time Spent on this Encounter   I, Kesha Velasquez MD, personally saw the patient today and spent greater than 30 minutes discharging this patient.     Kesha Velasquez MD, [place] [address] [facility]  [facility] [place] [address]  Phone: [phone]  ______________________________________________________________________    Physical Exam   Vital Signs: Temp: 98.4  F (36.9  C) Temp src: Oral BP: 123/47 Pulse: 86   Resp: 18 SpO2: 95 % O2 Device: None (Room air)    Weight: 100 lbs 4.8 oz    Physical Exam  Vitals signs and nursing note reviewed.   Constitutional:       Appearance: Normal appearance. She is well-developed.      Comments: Speaks slowly and answers in few words   HENT:      Head: Normocephalic and atraumatic.   Eyes:      Conjunctiva/sclera: Conjunctivae normal.      Pupils: Pupils are equal, round, and reactive to light.   Neck:      Musculoskeletal:  Normal range of motion and neck supple.      Thyroid: No thyromegaly.   Cardiovascular:      Rate and Rhythm: Normal rate and regular rhythm.      Heart sounds: Normal heart sounds. No murmur.   Pulmonary:      Effort: Pulmonary effort is normal. No respiratory distress.      Breath sounds: Normal breath sounds. No wheezing.   Abdominal:      General: Bowel sounds are normal.      Palpations: Abdomen is soft.      Tenderness: There is no abdominal tenderness. There is no guarding or rebound.   Musculoskeletal: Normal range of motion.         General: No deformity.   Skin:     General: Skin is warm and dry.   Neurological:      General: No focal deficit present.      Mental Status: She is alert. Mental status is at baseline.   Psychiatric:         Behavior: Behavior normal.          Primary Care Physician   Maksim Castano    Discharge Orders      Home care nursing referral      Home Care PT Referral for Hospital Discharge      Home Care OT Referral for Hospital Discharge      Reason for your hospital stay    You were admitted to the hospital secondary to confusion and was found to have low blood sugars and mild CHF exacerbation     Follow-up and recommended labs and tests    Follow up with primary care provider, Maksim Castano, within 7 days to evaluate medication change, for hospital follow- up and to follow up on results.  The following labs/tests are recommended: BMP and Hgb.     Activity    Your activity upon discharge: activity as tolerated and no driving     Discharge Instructions    You are advised to get your blood sugars checked 4 times a day and if they are below 70 , please take any juice or candy to bring the blood sugars up.you are also discharged on lasix 10 mg po along with potasium supplement every 48 hours or every other day .you are also started on iron supplement for anemia , you are also discharged on Flomax due to the concern for urinary retention , hopefully this can be stopped in one month.  "You family doctor will be checking her labs again on follow up along with repeat blood counts     Heart Failure Instructions for Patients and Families: Please read and check off each of these important instructions as you do them when you get home.     Weight and Symptoms    ___ Put a scale in your bathroom.    ___ Post a weight chart or calendar next to your scale.    ___ Weigh yourself everyday as soon as you get up in the morning (before breakfast).  You should only be wearing your pajamas.  Write your weight on the chart/calendar.    ___ Bring your weight chart/calendar with you to all appointments.    ___ Call your doctor or nurse practitioner if you gain 2 pounds (in 1 day) or 5 pounds in (1 week) from your goal \"good\" weight.  Your good weight is also called your \"dry\" weight.  Your doctor or nurse will tell you what your good weight should be.    ___ Call your doctor or nurse practitioner if you have shortness of breath that gets worse over time, leg swelling or fatigue.    Medications and Diet    ___ Make sure to take your medication as prescribed.    ___ Bring a current list of your medication and all of your medicine bottles with you to all appointments.    ___ Limit fluids if you still have swelling or shortness of breath, or if your doctor tells you to do so.      ___ Eat less than 2000 mg of sodium (salt) every day. Read food labels, and do not add salt to meals. Remember, if you eat less salt you retain less fluid.    ___ Follow a heart healthy diet that is low in saturated fat.         Activity and Suggested Lifestyle Changes   ___ Stay active. Talk to your doctor about an exercise program that is safe for your heart.    ___ Stop smoking. Reduce alcohol use.      ___ Lose weight if you are overweight. Extra weight puts a lot of stress on the heart.    Control for Leg Swelling   ___ Keep your legs elevated (raised) as needed for swelling. If swelling is uncomfortable or elevation doesn't help, ask " your doctor about using ACE wraps or support stockings.      What is the [facility]?     Cardiomyopathy  Optimization  Rehabilitation  Education    The [facility] is a heart failure specialty clinic within [facility].  It is an outpatient disease management program that is based on a phase-by-phase approach, which is tailored to each patient's individual needs.  The cardiologist, nurse practitioner, physician assistant and nurses provide an ongoing outpatient care and treatment plan that guides heart failure and cardiomyopathy patients from evaluation and education to stabilization. This team works with your current primary care doctor and cardiologist to help you:    Avoid hospitalizations  Slow the progression of the disease  Improve length and quality of life  Know who and when to call if heart failure symptoms appear  Receive easy access to quality health care and advice  Better understand your condition and treatment  Decrease the tremendous cost burden of heart failure care  Detect future heart problems before they become life threatening    Your [facility] Clinic Team will continue to educate you on your heart failure and may adjust medications based on your vital signs, lab work, and how you are feeling.  Therefore, it is very important to bring the following to all [facility].O.R.E[facility] appointments:    An accurate list of your medications  Your medicine bottles  Your weight chart/calendar    [facility]):    [phone]  [facility]):               [phone]  [facility]): [phone]     MD face to face encounter    Documentation of Face to Face and Certification for Home Health Services    I certify that patient: Emily Lockett is under my care and that I, or a nurse practitioner or physician's assistant working with me, had a face-to-face encounter that meets the physician face-to-face encounter requirements  with this patient on: 1/1/2021.    This encounter with the patient was in whole, or in part, for the following medical condition, which is the primary reason for home health care: Acute illness myopathy and debilitation    I certify that, based on my findings, the following services are medically necessary home health services: Nursing, Occupational Therapy and Physical Therapy.    My clinical findings support the need for the above services because: Nurse is needed: To assess vitals after changes in medications or other medical regimen., Occupational Therapy Services are needed to assess and treat cognitive ability and address ADL safety due to impairment in cognition. and Physical Therapy Services are needed to assess and treat the following functional impairments: Acute debilitation    Further, I certify that my clinical findings support that this patient is homebound (i.e. absences from home require considerable and taxing effort and are for medical reasons or [employer] services or infrequently or of short duration when for other reasons) because: Leaving home is medically contraindicated for the following reason(s): Dyspnea on exertion that makes it so they cannot leave their home for needed services without clinical deterioration.    Based on the above findings. I certify that this patient is confined to the home and needs intermittent skilled nursing care, physical therapy and/or speech therapy.  The patient is under my care, and I have initiated the establishment of the plan of care.  This patient will be followed by a physician who will periodically review the plan of care.  Physician/Provider to provide follow up care: Maksim Castano    [facility] physician (the Medicare certified [place] provider): Kesha Velasquez MD  Physician Signature: See electronic signature associated with these discharge orders.  Date: 1/1/2021     No CPR- Do NOT Intubate     Diet    Follow this diet upon discharge: Orders  Placed This Encounter   Dysphagia Diet Level 1 Pureed Nectar Thickened Liquids (pre-thickened or use instant food thickener)  You can also consume Boost or ensure 2-3 times a day to provide extra calories and protein supplement       Significant Results and Procedures   Results for orders placed or performed during the hospital encounter of 12/28/20   CT Head w/o Contrast    Narrative    CT OF THE HEAD WITHOUT CONTRAST 12/28/2020 1:43 PM     COMPARISON: None    HISTORY: Garbled speech. Right facial droop.    TECHNIQUE: 5 mm thick axial CT images of the head were acquired  without IV contrast material.    FINDINGS:  There is moderate diffuse cerebral volume loss. There are  subtle patchy areas of decreased density in the cerebral white matter  bilaterally that are consistent with sequela of chronic small vessel  ischemic disease.     The ventricles and basal cisterns are within normal limits in  configuration given the degree of cerebral volume loss.  There is no  midline shift. There are no extra-axial fluid collections.     No intracranial hemorrhage, mass or recent infarct.    The visualized paranasal sinuses are well-aerated. There is no  mastoiditis. There are no fractures of the visualized bones.       Impression    IMPRESSION:  Diffuse cerebral volume loss and cerebral white matter  changes consistent with chronic small vessel ischemic disease. No  evidence for acute intracranial pathology.      Radiation dose for this scan was reduced using automated exposure  control, adjustment of the mA and/or kV according to patient size, or  iterative reconstruction technique.    ROXANN SANTIZO MD   CTA Head Neck with Contrast    Narrative    CT ANGIOGRAM OF THE HEAD AND NECK WITHOUT AND WITH CONTRAST   12/28/2020 1:48 PM     COMPARISON: None    HISTORY: Garbled speech. Right facial droop.    TECHNIQUE:  Precontrast localizing scans were followed by CT  angiography with an injection of 70 mL Isovue-370 nonionic  intravenous  contrast material with scans through the head and neck.  Images were  transferred to a separate 3-D workstation where multiplanar  reformations and 3-D images were created.  Estimates of carotid  stenoses are made relative to the distal internal carotid artery  diameters except as noted.      FINDINGS:   Neck CTA: There is severe calcified and noncalcified atherosclerotic  plaque throughout the aortic arch. There is moderate atherosclerotic  narrowing of the origin of the left subclavian artery. The arch  origins of the great vessels are otherwise patent without stenosis.  Common carotid arteries bilaterally are patent without stenosis. There  is calcified plaque at the origins of the internal carotid arteries on  both sides that does not result in any significant vascular narrowing  on either side. The cervical internal carotid arteries bilaterally are  otherwise patent and unremarkable. The right vertebral artery is  widely patent without vascular narrowing. The left vertebral artery is  tiny likely due to origin stenosis and demonstrates only intermittent  flow in the V1 and proximal V2 segments as well as small V3 and V4  segments, likely due to a combination of proximal stenosis and diffuse  atherosclerotic plaque.    Head CTA: There is heavily calcified atherosclerotic plaque of the  intracranial distal internal carotid arteries on both sides that does  not result in any significant narrowing on either side. The basilar  artery is patent and unremarkable. There are multiple foci of moderate  presumed atherosclerotic narrowing throughout the A2 segment of the  right anterior cerebral artery. The anterior cerebral arteries  bilaterally are otherwise patent and unremarkable. There is diffuse  moderate vascular narrowing throughout the M1 segment of the left  middle cerebral artery that is likely atherosclerotic in nature. The  left middle cerebral artery and its branches are otherwise patent with  no  evidence for occluded vessels. The right middle cerebral and  bilateral posterior cerebral arteries are patent and unremarkable. The  anterior communicating artery is patent.      Impression    IMPRESSION:  1. Extensive calcified and noncalcified atherosclerotic plaque  throughout the aortic arch.  2. Moderate atherosclerotic narrowing at the origin of the left  subclavian artery.  3. Plaque without stenosis at the origins of the internal carotid  arteries on both sides.  4. Tiny left vertebral artery demonstrating only intermittent flow  likely due to high-grade proximal stenosis and multiple areas of  atherosclerotic narrowing throughout.  5. Widely patent right vertebral artery.  6. Plaque without stenosis of the intracranial distal internal carotid  arteries on both sides.  7. Moderate narrowing throughout the M1 segment of the left middle  cerebral artery that is likely atherosclerotic in nature.  8. Multiple areas of moderate presumed atherosclerotic narrowing  throughout the A2 segment of the right anterior cerebral artery.  9. Otherwise, normal head CTA.    Radiation dose for this scan was reduced using automated exposure  control, adjustment of the mA and/or kV according to patient size, or  iterative reconstruction technique.    ROXANN SANTIZO MD   CT Head Perfusion w Contrast    Narrative    CT BRAIN PERFUSION 12/28/2020 1:59 PM    COMPARISON: None.    HISTORY: Garbled speech. Right facial droop.    TECHNIQUE: Time sequential axial CT images of the head were acquired  during the administration of intravenous contrast (50 mL [id number]-370).  CTA images of the [place] as well as color perfusion maps of  the brain were created from this time sequential axial source data.      Impression    IMPRESSION: There is delayed arrival of the contrast bolus to the  anterior aspect of the left temporal lobe with an associated area of  decreased cerebral blood flow without an associated area of decreased  cerebral  blood volume. This is likely related to the presumed  atherosclerotic narrowing of the M1 segment of the left middle  cerebral artery. No definite infarct. No other perfusion defects.      Radiation dose for this scan was reduced using automated exposure  control, adjustment of the mA and/or kV according to patient size, or  iterative reconstruction technique.    ROXANN SANTIZO MD   MR Brain w/o & w Contrast    Narrative    MRI BRAIN WITHOUT AND WITH CONTRAST  12/28/2020 5:08 PM     HISTORY:  Focal neurologic deficit, greater than 6 hours, stroke  suspected.    TECHNIQUE:  Multiplanar, multisequence MRI of the brain without and  with 4.5mL Gadavist.    COMPARISON: Head CT from earlier the same day.     FINDINGS: No restricted diffusion to suggest acute infarct. No mass  effect or midline shift. No evidence of acute intracranial hemorrhage.  Chronic-appearing cortical encephalomalacia in the inferior left  occipital lobe. Mild diffuse parenchymal volume loss. Patchy  periventricular white matter T2 hyperintensities are nonspecific, but  likely related to chronic microvascular ischemic disease. Ventricular  size is within normal limits without evidence of hydrocephalus.    There is no abnormal intracranial enhancement.     The facial structures appear normal.       Impression    IMPRESSION:    1. No evidence of acute infarct, mass, hemorrhage, or herniation.  2. Mild diffuse parenchymal volume loss and white matter changes  likely due to chronic microvascular ischemic disease.  3. Chronic-appearing cortical encephalomalacia in the inferior left  occipital lobe.    MABEL SORENSON MD   XR Chest Port 1 View    Narrative    EXAM: XR CHEST PORT 1 VW  LOCATION: [facility]  DATE/TIME: 12/29/2020 10:21 PM    INDICATION: Crackles.  COMPARISON: None.    FINDINGS: Upright portable chest. The heart size is normal. Thoracic aorta is calcified. There is prominent interstitial disease in the periphery of the lungs in  lung bases. There is a left basilar infiltrate. No pneumothorax. Degenerative disease in the   spine.      Impression    IMPRESSION:   1. Interstitial pulmonary edema.  2. Left basilar atelectasis or pneumonia.       Discharge Medications   Current Discharge Medication List      START taking these medications    Details   alcohol swab prep pads Use to swab area of injection/jose as directed.  Qty: 100 each, Refills: 0    Comments: Future refills by PCP Dr. Maksim Castano with phone number None.  Associated Diagnoses: Hypoglycemia; Acute confusion due to known medical condition      blood glucose (NO BRAND SPECIFIED) test strip Use to test blood sugar 4 times daily or as directed.  Qty: 100 strip, Refills: 0    Comments: To accompany: glucometer per insurance.Future refills by PCP Dr. Maksim Castano with phone number None.  Associated Diagnoses: Hypoglycemia; Acute confusion due to known medical condition      blood glucose calibration (NO BRAND SPECIFIED) solution Use to calibrate blood glucose monitor as needed as directed.  Qty: 1 Bottle, Refills: 0    Comments: To accompany: Glucometer per insurance.Future refills by PCP Dr. Maksim Castano with phone number None.  Associated Diagnoses: Hypoglycemia; Acute confusion due to known medical condition      blood glucose monitoring (NO BRAND SPECIFIED) meter device kit Use to test blood sugar 4 times daily or as directed.  Qty: 1 kit, Refills: 0    Comments: Preferred blood glucose meter OR supplies to accompany: glucometer per insurance  Associated Diagnoses: Hypoglycemia; Acute confusion due to known medical condition      ferrous sulfate (FEROSUL) 325 (65 Fe) MG tablet Take 1 tablet (325 mg) by mouth daily (with breakfast)  Qty: 30 tablet, Refills: 0    Associated Diagnoses: Hypoglycemia; Acute confusion due to known medical condition      furosemide (LASIX) 20 MG tablet Take 0.5 tablets (10 mg) by mouth every 48 hours  Qty: 30 tablet, Refills: 0    Comments:  Future refills by PCP Dr. Maksim Castano with phone number None.  Associated Diagnoses: Hypoglycemia; Acute confusion due to known medical condition      glucose 15 GM/32ML GEL gel Take 15 g by mouth every hour as needed For hypoglycemia  Qty: 15 Package, Refills: 0    Comments: Future refills by PCP Dr. Maksim Castano with phone number None.  Associated Diagnoses: Hypoglycemia; Acute confusion due to known medical condition      potassium chloride ER (KLOR-CON M) 10 MEQ CR tablet Take 1 tablet (10 mEq) by mouth every 48 hours  Qty: 30 tablet, Refills: 0    Comments: Future refills by PCP Dr. Maksim Castano with phone number None.  Associated Diagnoses: Hypoglycemia; Acute confusion due to known medical condition      tamsulosin (FLOMAX) 0.4 MG capsule Take 1 capsule (0.4 mg) by mouth daily (with dinner)  Qty: 30 capsule, Refills: 0    Associated Diagnoses: Hypoglycemia; Acute confusion due to known medical condition         CONTINUE these medications which have NOT CHANGED    Details   acetaminophen (TYLENOL) 500 MG tablet Take 1,000 mg by mouth 2 times daily  MG every day PRN      amLODIPine (NORVASC) 2.5 MG tablet Take 2.5 mg by mouth daily      aspirin (ASA) 81 MG chewable tablet Take 81 mg by mouth daily      bimatoprost (LUMIGAN) 0.01 % SOLN Place 1 drop into both eyes At Bedtime      calcium carbonate (TUMS) 500 MG chewable tablet Take 1 chew tab by mouth as needed for heartburn 2-3 TIMES every day PRN      carboxymethylcellulose PF (REFRESH LIQUIGEL) 1 % ophthalmic gel Place 1 drop into both eyes 2 times daily       Cyanocobalamin (VITAMIN DEFICIENCY SYSTEM-B12) 1000 MCG/ML KIT Inject 1,000 mcg as directed every 28 days      dorzolamide (TRUSOPT) 2 % ophthalmic solution Place 1 drop into both eyes 2 times daily      gabapentin (NEURONTIN) 300 MG capsule Take 300 mg by mouth At Bedtime      ketoconazole (NIZORAL) 2 % external shampoo Apply topically twice a week      lactobacillus rhamnosus, GG,  (CULTURELL) capsule Take 1 capsule by mouth 2 times daily      levothyroxine (SYNTHROID/LEVOTHROID) 88 MCG tablet Take 88 mcg by mouth daily       loperamide (IMODIUM) 2 MG capsule Take 2 mg by mouth every 4 hours as needed for diarrhea MAX 2MG 3 XDAY      miconazole (MICATIN) 2 % AERP powder Apply topically 2 times daily as needed Apply to under breasts topically as needed for redness      Multiple Vitamins-Minerals (PRESERVISION AREDS) CAPS Take 1 capsule by mouth 2 times daily      omeprazole (PRILOSEC) 20 MG DR capsule Take 20 mg by mouth daily      QUEtiapine (SEROQUEL) 25 MG tablet Take 12.5 mg by mouth At Bedtime      simvastatin (ZOCOR) 20 MG tablet Take 20 mg by mouth At Bedtime      timolol maleate (TIMOPTIC) 0.5 % ophthalmic solution Place 1 drop into both eyes 2 times daily      triamcinolone (KENALOG) 0.1 % external cream Apply topically 2 times daily as needed for irritation           Allergies   Allergies   Allergen Reactions     Codeine      Other reaction(s): Vomiting     Nsaids      Other reaction(s): Renal Failure     Penicillins Rash

## 2021-01-01 NOTE — PROGRESS NOTES
Care Management Discharge Note    Discharge Date: 01/01/21  Expected Time of Departure: (11:30)    Discharge Disposition: Assisted Living, Home Care    Discharge Services: None    Discharge DME: None    Discharge Transportation: agency(see additonal chart note section)    Private pay costs discussed: transportation costs    PAS Confirmation Code:  Not applicable Patient/family educated on Medicare website which has current facility and service quality ratings: (not applicable)    Education Provided on the Discharge Plan:  John Penaloza  Persons Notified of Discharge Plans: son and Ning CARMICHAEL  Patient/Family in Agreement with the Plan: yes    Handoff Referral Completed: Yes    Additional Information:  JANY Joel bedside nurse, spoke with JANY Barclay this morning.  Ning is comfortable with patient returning today.  Orders, and EMR data sent home with patient.  Update son  and updated patient's address and marital status.    Bedside RN felt patient was safe to travel by medivan and writer reviewed the cost with son.  Arranged for [facility] to transport at 1130.  When [facility]  arrived and assessed patient she told staff she didn't feel patient was appropriate for medivan transport due to patient's cognition.  She called dispatch, reviewed her concerns and requested BLS staff transport patient.Writer did complete a PCS indicating the BLS was arranged by  [facility] with concern patient may need supervision for safety due to her cognition.   Updated son regarding the change in transportation, explained the cost is submitted to Medicare.  Writer asked son to contact writer if he receives denial for transport cost.   Contacted patient's home care agency,  [facility] and  spoke with Angie [phone].  She stated they earliest they will start or resume services is 1/5/2021.  Orders faxed  with clinical data to [phone].  Per Ning, patient was receiving home PT.  The orders for RN and OT are new  orders.         Judith Lord, [facility]        Judith Lord, [facility]

## 2021-01-01 NOTE — PLAN OF CARE
"Speech Language Therapy Discharge Summary    Reason for therapy discharge:    Discharged to home with home therapy.    Progress towards therapy goal(s). See goals on Care Plan in [facility] electronic health record for goal details.  Goals not met.  Barriers to achieving goals:   discharge from facility.    Therapy recommendation(s):    No further therapy is recommended.  \"Patient slowly improving in mentation and swallow function. Continue on the DDL 1 with nectar thick liquids.\"      "

## 2021-01-01 NOTE — PROGRESS NOTES
Discharge    Patient discharged to [facility] via transporation with [facility].   Care plan note: vss, on RA. Denied pain. Alert to self and place. SBA x1 with GB/WK. BG 71/119 before meals. Updated [facility] staff. Meds and glucometer filled in home pharmacy. SonJohn, updated over the phone. Pt discharged in stable condition. Pt will resume home PT/RN    Listed belongings gathered and returned to patient. Yes  Care Plan and Patient education resolved: Yes  Prescriptions if needed, hard copies sent with patient  NA  Home and hospital acquired medications returned to patient: Yes  Medication Bin checked and emptied on discharge Yes  Follow up appointment made for patient: No

## 2021-01-02 NOTE — PLAN OF CARE
Occupational Therapy Discharge Summary    Reason for therapy discharge:    Discharged to home with home therapy.    Progress towards therapy goal(s). See goals on Care Plan in [facility] electronic health record for goal details.  Goals not met.  Barriers to achieving goals:   discharge from facility.    Therapy recommendation(s):    Continued therapy is recommended.  Rationale/Recommendations:  to maximize safety and IND in ADLs.    Pt not seen by therapist on this date, discharge based on previous therapy notes and recommendations

## 2021-01-02 NOTE — PLAN OF CARE
Physical Therapy Discharge Summary    Reason for therapy discharge:    Discharged to home with home therapy.    Progress towards therapy goal(s). See goals on Care Plan in [facility] electronic health record for goal details.  Goals not met.  Barriers to achieving goals:   discharge from facility.    Therapy recommendation(s):    Continued therapy is recommended.  Rationale/Recommendations:  eval and treat with home PT.

## 2021-01-05 ENCOUNTER — DOCUMENTATION ONLY (OUTPATIENT)
Dept: OTHER | Facility: CLINIC | Age: 86
End: 2021-01-05

## 2021-01-05 ENCOUNTER — AMBULATORY - HEALTHEAST (OUTPATIENT)
Dept: OTHER | Facility: CLINIC | Age: 86
End: 2021-01-05

## 2021-01-05 PROBLEM — Z66 DNR (DO NOT RESUSCITATE): Status: RESOLVED | Noted: 2018-07-24 | Resolved: 2021-01-05

## 2021-01-10 LAB
GLUCOSE BLDC GLUCOMTR-MCNC: 204 MG/DL (ref 70–99)
GLUCOSE BLDC GLUCOMTR-MCNC: 23 MG/DL (ref 70–99)
GLUCOSE BLDC GLUCOMTR-MCNC: 409 MG/DL (ref 70–99)